# Patient Record
Sex: FEMALE | Race: OTHER | Employment: FULL TIME | URBAN - METROPOLITAN AREA
[De-identification: names, ages, dates, MRNs, and addresses within clinical notes are randomized per-mention and may not be internally consistent; named-entity substitution may affect disease eponyms.]

---

## 2017-03-22 ENCOUNTER — OFFICE VISIT (OUTPATIENT)
Dept: FAMILY MEDICINE CLINIC | Facility: CLINIC | Age: 57
End: 2017-03-22

## 2017-03-22 VITALS
SYSTOLIC BLOOD PRESSURE: 160 MMHG | DIASTOLIC BLOOD PRESSURE: 92 MMHG | TEMPERATURE: 98 F | WEIGHT: 173 LBS | HEART RATE: 61 BPM | BODY MASS INDEX: 34 KG/M2

## 2017-03-22 DIAGNOSIS — I10 ESSENTIAL HYPERTENSION WITH GOAL BLOOD PRESSURE LESS THAN 140/90: Primary | ICD-10-CM

## 2017-03-22 PROCEDURE — 99212 OFFICE O/P EST SF 10 MIN: CPT | Performed by: FAMILY MEDICINE

## 2017-03-22 PROCEDURE — 99214 OFFICE O/P EST MOD 30 MIN: CPT | Performed by: FAMILY MEDICINE

## 2017-03-22 RX ORDER — BLOOD PRESSURE TEST KIT
KIT MISCELLANEOUS
Qty: 1 KIT | Refills: 0 | Status: SHIPPED | OUTPATIENT
Start: 2017-03-22 | End: 2018-12-13

## 2017-03-22 RX ORDER — CLONIDINE HYDROCHLORIDE 0.1 MG/1
0.1 TABLET ORAL 2 TIMES DAILY
Qty: 60 TABLET | Refills: 1 | Status: SHIPPED | OUTPATIENT
Start: 2017-03-22 | End: 2017-04-05

## 2017-03-22 RX ORDER — AMLODIPINE AND VALSARTAN 5; 320 MG/1; MG/1
1 TABLET ORAL DAILY
Qty: 30 TABLET | Refills: 1 | Status: SHIPPED | OUTPATIENT
Start: 2017-03-22 | End: 2017-07-08

## 2017-03-22 NOTE — PROGRESS NOTES
3/22/2017  11:16 AM    Ousmane Duran is a 64year old female. Chief complaint(s): Patient presents with: Follow - Up: Patient here to f/u on her HTN  HTN    HPI:     Ousmane Duran primary complaint is regarding HTN.      Here for follow up regarding her HTN Cardiovascular: Negative for chest pain and palpitations. Gastrointestinal: Negative for nausea, vomiting, abdominal pain, diarrhea and constipation. Musculoskeletal: Negative for back pain. Neurological: Negative for seizures and headaches.    Psy 48.0 %   MEAN CORPUSCULAR VOLUME 96.5 80.0 - 100.0 FL   MEAN CORPUSCULAR HEMOGLOBIN 32.8 (H) 27.0 - 32.0 PG   MEAN CORPUSCULAR HEMOGLOBIN CO 33.9 32.0 - 37.0 G/DL   RED CELL DISTRIBUTION WIDTH 13.0 11.0 - 15.0 %   PLATELET COUNT (L) 429 140 - 400 K/UL   ME 30 tablet 1      Sig: Take 1 tablet by mouth daily.         RECOMMENDATIONS given include: avoid pseudoephedrine or other stimulants/decongestants in common cold remedies, decrease consumption of alcohol, perform routine monitoring of blood pressure with ho

## 2017-04-05 ENCOUNTER — OFFICE VISIT (OUTPATIENT)
Dept: FAMILY MEDICINE CLINIC | Facility: CLINIC | Age: 57
End: 2017-04-05

## 2017-04-05 VITALS
TEMPERATURE: 98 F | SYSTOLIC BLOOD PRESSURE: 132 MMHG | HEART RATE: 79 BPM | DIASTOLIC BLOOD PRESSURE: 88 MMHG | BODY MASS INDEX: 34 KG/M2 | WEIGHT: 175 LBS

## 2017-04-05 DIAGNOSIS — I10 ESSENTIAL HYPERTENSION WITH GOAL BLOOD PRESSURE LESS THAN 140/90: Primary | ICD-10-CM

## 2017-04-05 PROCEDURE — 99212 OFFICE O/P EST SF 10 MIN: CPT | Performed by: FAMILY MEDICINE

## 2017-04-05 PROCEDURE — 99214 OFFICE O/P EST MOD 30 MIN: CPT | Performed by: FAMILY MEDICINE

## 2017-04-05 RX ORDER — VALSARTAN 320 MG/1
320 TABLET ORAL DAILY
Qty: 90 TABLET | Refills: 1 | Status: SHIPPED | OUTPATIENT
Start: 2017-04-05 | End: 2017-07-08

## 2017-04-05 RX ORDER — AMLODIPINE BESYLATE 5 MG/1
5 TABLET ORAL DAILY
Qty: 90 TABLET | Refills: 2 | Status: SHIPPED | OUTPATIENT
Start: 2017-04-05 | End: 2017-07-08

## 2017-04-05 RX ORDER — CLONIDINE HYDROCHLORIDE 0.1 MG/1
0.1 TABLET ORAL 2 TIMES DAILY
Qty: 60 TABLET | Refills: 1 | Status: SHIPPED | OUTPATIENT
Start: 2017-04-05 | End: 2017-07-08

## 2017-04-05 NOTE — PROGRESS NOTES
4/5/2017  11:05 AM    Ken Kumar is a 64year old female. Chief complaint(s): Patient presents with: Follow - Up  HTN    HPI:     Ken Kumar primary complaint is regarding HTN.      Here for follow up regarding her HTN, this was first diagnosed more t of Systems   Constitutional: Negative for fever, chills and fatigue. HENT: Negative for ear pain and sore throat. Respiratory: Negative for cough and wheezing. Cardiovascular: Negative for chest pain and palpitations.    Gastrointestinal: Negative f WITH PLATELET   Result Value Ref Range   WHITE BLOOD COUNT (L) 7.1 4.0 - 11.0 K/UL   RED BLOOD CELL COUNT 4.37 3.70 - 5.40 M/UL   HEMOGLOBIN (L) 14.3 12.0 - 16.0 G/DL   HEMATOCRIT (L) 42.2 35.0 - 48.0 %   MEAN CORPUSCULAR VOLUME 96.5 80.0 - 100.0 FL   MEAN Sig: Take 1 tablet (320 mg total) by mouth daily. AmLODIPine Besylate 5 MG Oral Tab 90 tablet 2      Sig: Take 1 tablet (5 mg total) by mouth daily.       CloNIDine HCl 0.1 MG Oral Tab 60 tablet 1      Sig: Take 1 tablet (0.1 mg total) by mouth 2 (two)

## 2017-07-08 ENCOUNTER — OFFICE VISIT (OUTPATIENT)
Dept: FAMILY MEDICINE CLINIC | Facility: CLINIC | Age: 57
End: 2017-07-08

## 2017-07-08 VITALS
DIASTOLIC BLOOD PRESSURE: 85 MMHG | HEART RATE: 93 BPM | TEMPERATURE: 98 F | WEIGHT: 174 LBS | BODY MASS INDEX: 34 KG/M2 | SYSTOLIC BLOOD PRESSURE: 132 MMHG

## 2017-07-08 DIAGNOSIS — M72.2 PLANTAR FASCIITIS: ICD-10-CM

## 2017-07-08 DIAGNOSIS — I10 ESSENTIAL HYPERTENSION WITH GOAL BLOOD PRESSURE LESS THAN 140/90: Primary | ICD-10-CM

## 2017-07-08 PROCEDURE — 99212 OFFICE O/P EST SF 10 MIN: CPT | Performed by: FAMILY MEDICINE

## 2017-07-08 PROCEDURE — 99214 OFFICE O/P EST MOD 30 MIN: CPT | Performed by: FAMILY MEDICINE

## 2017-07-08 RX ORDER — VALSARTAN 320 MG/1
320 TABLET ORAL DAILY
Qty: 90 TABLET | Refills: 1 | Status: SHIPPED | OUTPATIENT
Start: 2017-07-08 | End: 2018-01-22

## 2017-07-08 RX ORDER — CLONIDINE HYDROCHLORIDE 0.1 MG/1
0.1 TABLET ORAL 2 TIMES DAILY
Qty: 60 TABLET | Refills: 1 | Status: SHIPPED | OUTPATIENT
Start: 2017-07-08 | End: 2018-12-13

## 2017-07-08 RX ORDER — AMLODIPINE BESYLATE 5 MG/1
5 TABLET ORAL DAILY
Qty: 90 TABLET | Refills: 2 | Status: SHIPPED | OUTPATIENT
Start: 2017-07-08 | End: 2018-01-22

## 2017-07-08 RX ORDER — DICLOFENAC POTASSIUM 50 MG/1
50 TABLET, FILM COATED ORAL 2 TIMES DAILY
Qty: 60 TABLET | Refills: 1 | Status: SHIPPED | OUTPATIENT
Start: 2017-07-08 | End: 2018-12-13

## 2017-07-08 NOTE — PROGRESS NOTES
7/8/2017  12:38 PM    Samina Hoff is a 64year old female. Chief complaint(s): Patient presents with: Follow - Up  HTN  Heel Pain: left heel pain    HPI:     Samina Hoff primary complaint is regarding heel pain, HTN.      Here for follow up regarding he Rfl: 2   CloNIDine HCl 0.1 MG Oral Tab Take 1 tablet (0.1 mg total) by mouth 2 (two) times daily. Disp: 60 tablet Rfl: 1   valsartan 320 MG Oral Tab Take 1 tablet (320 mg total) by mouth daily.  Disp: 90 tablet Rfl: 1   Blood Pressure Does not apply Kit [place] Negative mg/dL   BILIRUBIN negative Negative   KETONES (URINE DIPSTICK) negative Negative mg/dL   SPECIFIC GRAVITY 1.015 1.005 - 1.030   OCCULT BLOOD trace Negative   PH, URINE 6.0 4.5 - 8.0   PROTEIN (URINE DIPSTICK) negative Negative/Trace mg/dL   UROBIL AMERICAN >60 >60   GFR/ >60 >60   -TSH W REFLEX TO FREE T4   Result Value Ref Range   TSH 2.38 0.34 - 5.60 uIU/mL   -URINE CULTURE, ROUTINE   Result Value Ref Range   Urine Culture See Result      EKG / Spirometry : -     Radiology: No resu valsartan 320 MG Oral Tab 90 tablet 1      Sig: Take 1 tablet (320 mg total) by mouth daily. RECOMMENDATIONS given include: Please, call our office with any questions or concerns.  Notify Dr Robert Chery or the [address] [place] [address] if there is a deteriora

## 2017-08-28 ENCOUNTER — APPOINTMENT (OUTPATIENT)
Dept: OCCUPATIONAL MEDICINE | Age: 57
End: 2017-08-28
Attending: EMERGENCY MEDICINE

## 2018-01-22 RX ORDER — VALSARTAN 320 MG/1
320 TABLET ORAL DAILY
Qty: 90 TABLET | Refills: 0 | Status: SHIPPED | OUTPATIENT
Start: 2018-01-22 | End: 2018-12-13

## 2018-01-22 RX ORDER — AMLODIPINE BESYLATE 5 MG/1
5 TABLET ORAL DAILY
Qty: 90 TABLET | Refills: 0 | Status: SHIPPED | OUTPATIENT
Start: 2018-01-22 | End: 2018-12-13

## 2018-01-22 NOTE — TELEPHONE ENCOUNTER
Patient states that she will be leaving to [place] tomorrow and needs refills on her BP medications. Medications were pended please advise.

## 2018-12-08 RX ORDER — VALSARTAN 320 MG/1
320 TABLET ORAL DAILY
Qty: 90 TABLET | Refills: 0 | OUTPATIENT
Start: 2018-12-08

## 2018-12-08 RX ORDER — DICLOFENAC POTASSIUM 50 MG/1
50 TABLET, FILM COATED ORAL 2 TIMES DAILY
Qty: 60 TABLET | Refills: 1 | OUTPATIENT
Start: 2018-12-08

## 2018-12-08 RX ORDER — AMLODIPINE BESYLATE 5 MG/1
5 TABLET ORAL DAILY
Qty: 90 TABLET | Refills: 0 | OUTPATIENT
Start: 2018-12-08

## 2018-12-10 RX ORDER — CLONIDINE HYDROCHLORIDE 0.1 MG/1
TABLET ORAL
Qty: 60 TABLET | Refills: 1 | OUTPATIENT
Start: 2018-12-10

## 2018-12-10 RX ORDER — AMLODIPINE BESYLATE 5 MG/1
TABLET ORAL
Qty: 90 TABLET | Refills: 0 | OUTPATIENT
Start: 2018-12-10

## 2018-12-13 ENCOUNTER — OFFICE VISIT (OUTPATIENT)
Dept: FAMILY MEDICINE CLINIC | Facility: CLINIC | Age: 58
End: 2018-12-13
Payer: COMMERCIAL

## 2018-12-13 VITALS
WEIGHT: 163.19 LBS | BODY MASS INDEX: 32 KG/M2 | SYSTOLIC BLOOD PRESSURE: 160 MMHG | DIASTOLIC BLOOD PRESSURE: 93 MMHG | HEART RATE: 65 BPM | TEMPERATURE: 98 F

## 2018-12-13 DIAGNOSIS — I10 ESSENTIAL HYPERTENSION: Primary | ICD-10-CM

## 2018-12-13 PROCEDURE — 99212 OFFICE O/P EST SF 10 MIN: CPT | Performed by: FAMILY MEDICINE

## 2018-12-13 PROCEDURE — 99214 OFFICE O/P EST MOD 30 MIN: CPT | Performed by: FAMILY MEDICINE

## 2018-12-13 RX ORDER — AMLODIPINE BESYLATE 5 MG/1
5 TABLET ORAL DAILY
Qty: 90 TABLET | Refills: 1 | Status: SHIPPED | OUTPATIENT
Start: 2018-12-13 | End: 2019-03-18

## 2018-12-13 RX ORDER — CLONIDINE HYDROCHLORIDE 0.1 MG/1
0.1 TABLET ORAL 2 TIMES DAILY
Qty: 180 TABLET | Refills: 1 | Status: SHIPPED | OUTPATIENT
Start: 2018-12-13 | End: 2019-08-17

## 2018-12-13 RX ORDER — VALSARTAN 320 MG/1
320 TABLET ORAL DAILY
Qty: 90 TABLET | Refills: 1 | Status: SHIPPED | OUTPATIENT
Start: 2018-12-13 | End: 2019-08-17

## 2018-12-13 NOTE — PROGRESS NOTES
12/13/2018  4:27 PM    Eugene Leone is a 62year old female. Chief complaint(s): Patient presents with: Follow - Up  HTN    HPI:     Eugene Leone primary complaint is regarding HTN.      Patient is a 62 yrs old female, here for follow up regarding her HTN for ear pain and sore throat. Respiratory: Negative for cough and wheezing. Cardiovascular: Negative for chest pain and palpitations. Gastrointestinal: Negative for nausea, vomiting, abdominal pain, diarrhea and constipation.    Musculoskeletal: Neg remedies, decrease consumption of alcohol, perform routine monitoring of blood pressure with home blood pressure cuff, exercise, reduction of dietary salt intake, take medication as prescribed, try not to miss doses, smoking cessation, weight loss, and str

## 2018-12-17 ENCOUNTER — TELEPHONE (OUTPATIENT)
Dept: FAMILY MEDICINE CLINIC | Facility: CLINIC | Age: 58
End: 2018-12-17

## 2018-12-17 RX ORDER — OLMESARTAN MEDOXOMIL AND HYDROCHLOROTHIAZIDE 40/12.5 40; 12.5 MG/1; MG/1
1 TABLET ORAL DAILY
Qty: 30 TABLET | Refills: 3 | Status: SHIPPED | OUTPATIENT
Start: 2018-12-17 | End: 2019-08-17

## 2018-12-17 NOTE — TELEPHONE ENCOUNTER
Pt is requesting new Blood Pressure meds. Pt stts the meds Dr prescribe is no longer making them. Pt was told from pharmacy.

## 2018-12-17 NOTE — TELEPHONE ENCOUNTER
Patient is on 3 BP medications, need to confirm which she is referring to. Contacted pharmacy, was on hold several times, unable to confirm with them. Left message with son requesting she call our office back to confirm which med they are no longer making.

## 2018-12-17 NOTE — TELEPHONE ENCOUNTER
Ricardocassi Timbo returned call, she does not recall which medication she was told.  She reports pharmacy faxed over the information to Dr BABB's office, however on file there are only noted to be refills and all refills have been sent, no new script was sent in as an al

## 2018-12-17 NOTE — TELEPHONE ENCOUNTER
Pt states she spoke with her pharmacy and it is the Valsartan that is on recall. Pt will need an alternative sent to pharmacy.

## 2019-03-19 RX ORDER — AMLODIPINE BESYLATE 5 MG/1
TABLET ORAL
Qty: 90 TABLET | Refills: 1 | Status: SHIPPED | OUTPATIENT
Start: 2019-03-19 | End: 2019-08-17

## 2019-08-12 ENCOUNTER — HOSPITAL ENCOUNTER (OUTPATIENT)
Age: 59
Discharge: HOME | End: 2019-08-12
Attending: EMERGENCY MEDICINE
Payer: COMMERCIAL

## 2019-08-12 VITALS
HEART RATE: 67 BPM | WEIGHT: 160 LBS | DIASTOLIC BLOOD PRESSURE: 95 MMHG | SYSTOLIC BLOOD PRESSURE: 157 MMHG | HEIGHT: 62 IN | TEMPERATURE: 98 F | OXYGEN SATURATION: 100 % | BODY MASS INDEX: 29.44 KG/M2 | RESPIRATION RATE: 18 BRPM

## 2019-08-12 DIAGNOSIS — H81.11 BENIGN PAROXYSMAL POSITIONAL VERTIGO OF RIGHT EAR: Primary | ICD-10-CM

## 2019-08-12 DIAGNOSIS — H61.21 IMPACTED CERUMEN OF RIGHT EAR: ICD-10-CM

## 2019-08-12 PROCEDURE — 69209 REMOVE IMPACTED EAR WAX UNI: CPT

## 2019-08-12 PROCEDURE — 99213 OFFICE O/P EST LOW 20 MIN: CPT

## 2019-08-12 PROCEDURE — 99204 OFFICE O/P NEW MOD 45 MIN: CPT

## 2019-08-12 RX ORDER — NEOMYCIN SULFATE, POLYMYXIN B SULFATE AND HYDROCORTISONE 10; 3.5; 1 MG/ML; MG/ML; [USP'U]/ML
1 SUSPENSION/ DROPS AURICULAR (OTIC) 4 TIMES DAILY
Qty: 1 BOTTLE | Refills: 0 | Status: SHIPPED | OUTPATIENT
Start: 2019-08-12 | End: 2019-09-28

## 2019-08-12 RX ORDER — ONDANSETRON 4 MG/1
4 TABLET, ORALLY DISINTEGRATING ORAL ONCE
Status: COMPLETED | OUTPATIENT
Start: 2019-08-12 | End: 2019-08-12

## 2019-08-12 RX ORDER — MECLIZINE HCL 12.5 MG/1
25 TABLET ORAL ONCE
Status: COMPLETED | OUTPATIENT
Start: 2019-08-12 | End: 2019-08-12

## 2019-08-12 RX ORDER — ONDANSETRON 4 MG/1
4 TABLET, ORALLY DISINTEGRATING ORAL EVERY 4 HOURS PRN
Qty: 10 TABLET | Refills: 0 | Status: SHIPPED | OUTPATIENT
Start: 2019-08-12 | End: 2019-08-19

## 2019-08-12 RX ORDER — MECLIZINE HYDROCHLORIDE 25 MG/1
25 TABLET ORAL 3 TIMES DAILY PRN
Qty: 21 TABLET | Refills: 0 | Status: SHIPPED | OUTPATIENT
Start: 2019-08-12 | End: 2019-08-17

## 2019-08-12 NOTE — ED PROVIDER NOTES
Patient Seen in: [facility] In [address] [place] [address]    History   Patient presents with:  Nausea/Vomiting/Diarrhea (gastrointestinal)    Stated Complaint: dizzy and vomiting     HPI    Patient complains of dizziness that started today at 1230.  P HPI.  Constitutional and vital signs reviewed. All other systems reviewed and negative except as noted above. PSFH elements reviewed from today and agreed except as otherwise stated in HPI.     Physical Exam     ED Triage Vitals [08/12/19 1732]   BP (4 mg total) by mouth every 4 (four) hours as needed for Nausea. Qty: 10 tablet Refills: 0    Meclizine HCl 25 MG Oral Tab  Take 1 tablet (25 mg total) by mouth 3 (three) times daily as needed.   Qty: 21 tablet Refills: 0    Neomycin-Polymyxin-HC [id number]

## 2019-08-12 NOTE — ED INITIAL ASSESSMENT (HPI)
Pt reports feeling dizzy like the room is spinning since this morning. Two episodes of vomiting. Denies abdominal pain, chest pain, shortness of breath, cough, congestion, ear pain or fever.

## 2019-08-17 ENCOUNTER — OFFICE VISIT (OUTPATIENT)
Dept: FAMILY MEDICINE CLINIC | Facility: CLINIC | Age: 59
End: 2019-08-17

## 2019-08-17 VITALS
BODY MASS INDEX: 32 KG/M2 | HEIGHT: 60 IN | DIASTOLIC BLOOD PRESSURE: 88 MMHG | SYSTOLIC BLOOD PRESSURE: 152 MMHG | WEIGHT: 163 LBS

## 2019-08-17 DIAGNOSIS — H81.10 BENIGN PAROXYSMAL POSITIONAL VERTIGO, UNSPECIFIED LATERALITY: ICD-10-CM

## 2019-08-17 DIAGNOSIS — I10 ESSENTIAL HYPERTENSION: Primary | ICD-10-CM

## 2019-08-17 PROCEDURE — 99214 OFFICE O/P EST MOD 30 MIN: CPT | Performed by: FAMILY MEDICINE

## 2019-08-17 RX ORDER — MECLIZINE HCL 12.5 MG/1
12.5 TABLET ORAL 3 TIMES DAILY PRN
Qty: 40 TABLET | Refills: 1 | Status: SHIPPED | OUTPATIENT
Start: 2019-08-17

## 2019-08-17 RX ORDER — CLONIDINE HYDROCHLORIDE 0.1 MG/1
0.1 TABLET ORAL 2 TIMES DAILY
Qty: 180 TABLET | Refills: 1 | Status: SHIPPED | OUTPATIENT
Start: 2019-08-17 | End: 2020-03-21

## 2019-08-17 RX ORDER — AMLODIPINE BESYLATE 5 MG/1
5 TABLET ORAL
Qty: 90 TABLET | Refills: 1 | Status: SHIPPED | OUTPATIENT
Start: 2019-08-17 | End: 2020-03-21

## 2019-08-17 RX ORDER — OLMESARTAN MEDOXOMIL AND HYDROCHLOROTHIAZIDE 40/12.5 40; 12.5 MG/1; MG/1
1 TABLET ORAL DAILY
Qty: 30 TABLET | Refills: 3 | Status: SHIPPED | OUTPATIENT
Start: 2019-08-17 | End: 2020-03-21

## 2019-08-17 NOTE — PROGRESS NOTES
8/17/2019  10:54 AM    Quinton Gould is a 62year old female. Chief complaint(s): Patient presents with:  Dizziness: x 5 days  HTN  HPI:     Quinton Gould primary complaint is regarding as above.      Patient is a 62 yrs old female, here for follow up regard tobacco: Never Used    Alcohol use: No      Alcohol/week: 0.0 standard drinks    Drug use: No       Immunizations: There is no immunization history on file for this patient.     Medications (Active prior to today's visit):    Current Outpatient Delbert Ashraf Mouth/Throat: Oropharynx is clear and moist.   Eyes: Conjunctivae are normal.   Neck: Neck supple. Cardiovascular: Normal rate and regular rhythm. Carotid bruit not present. Pulmonary/Chest: Effort normal.   Neurological: No cranial nerve deficit. •           • Meclizine HCl 12.5 MG Oral Tab 40 tablet 1     Sig: Take 1 tablet (12.5 mg total) by mouth 3 (three) times daily as needed. • Multiple Vitamins-Minerals (MULTIVITAL) Oral Tab 100 tablet 2     Sig: Take 1 tablet by mouth daily.

## 2019-09-28 ENCOUNTER — OFFICE VISIT (OUTPATIENT)
Dept: FAMILY MEDICINE CLINIC | Facility: CLINIC | Age: 59
End: 2019-09-28

## 2019-09-28 ENCOUNTER — LAB ENCOUNTER (OUTPATIENT)
Dept: LAB | Age: 59
End: 2019-09-28
Attending: FAMILY MEDICINE
Payer: COMMERCIAL

## 2019-09-28 VITALS
HEIGHT: 60 IN | WEIGHT: 164 LBS | SYSTOLIC BLOOD PRESSURE: 154 MMHG | HEART RATE: 57 BPM | TEMPERATURE: 98 F | BODY MASS INDEX: 32.2 KG/M2 | DIASTOLIC BLOOD PRESSURE: 82 MMHG

## 2019-09-28 DIAGNOSIS — I10 ESSENTIAL HYPERTENSION: Primary | ICD-10-CM

## 2019-09-28 DIAGNOSIS — I10 ESSENTIAL HYPERTENSION: ICD-10-CM

## 2019-09-28 LAB
ALBUMIN SERPL-MCNC: 3.8 G/DL (ref 3.4–5)
ALBUMIN/GLOB SERPL: 1 {RATIO} (ref 1–2)
ALP LIVER SERPL-CCNC: 88 U/L (ref 46–118)
ALT SERPL-CCNC: 32 U/L (ref 13–56)
ANION GAP SERPL CALC-SCNC: 5 MMOL/L (ref 0–18)
AST SERPL-CCNC: 18 U/L (ref 15–37)
BASOPHILS # BLD AUTO: 0.06 X10(3) UL (ref 0–0.2)
BASOPHILS NFR BLD AUTO: 0.9 %
BILIRUB SERPL-MCNC: 0.5 MG/DL (ref 0.1–2)
BUN BLD-MCNC: 10 MG/DL (ref 7–18)
BUN/CREAT SERPL: 15.6 (ref 10–20)
CALCIUM BLD-MCNC: 9.2 MG/DL (ref 8.5–10.1)
CHLORIDE SERPL-SCNC: 110 MMOL/L (ref 98–112)
CHOLEST SMN-MCNC: 200 MG/DL (ref ?–200)
CO2 SERPL-SCNC: 30 MMOL/L (ref 21–32)
CREAT BLD-MCNC: 0.64 MG/DL (ref 0.55–1.02)
DEPRECATED RDW RBC AUTO: 44.6 FL (ref 35.1–46.3)
EOSINOPHIL # BLD AUTO: 0.21 X10(3) UL (ref 0–0.7)
EOSINOPHIL NFR BLD AUTO: 3.2 %
ERYTHROCYTE [DISTWIDTH] IN BLOOD BY AUTOMATED COUNT: 12.6 % (ref 11–15)
GLOBULIN PLAS-MCNC: 3.7 G/DL (ref 2.8–4.4)
GLUCOSE BLD-MCNC: 86 MG/DL (ref 70–99)
HCT VFR BLD AUTO: 37 % (ref 35–48)
HDLC SERPL-MCNC: 56 MG/DL (ref 40–59)
HGB BLD-MCNC: 12.4 G/DL (ref 12–16)
IMM GRANULOCYTES # BLD AUTO: 0.01 X10(3) UL (ref 0–1)
IMM GRANULOCYTES NFR BLD: 0.2 %
LDLC SERPL CALC-MCNC: 123 MG/DL (ref ?–100)
LYMPHOCYTES # BLD AUTO: 2.58 X10(3) UL (ref 1–4)
LYMPHOCYTES NFR BLD AUTO: 38.9 %
M PROTEIN MFR SERPL ELPH: 7.5 G/DL (ref 6.4–8.2)
MCH RBC QN AUTO: 32.6 PG (ref 26–34)
MCHC RBC AUTO-ENTMCNC: 33.5 G/DL (ref 31–37)
MCV RBC AUTO: 97.4 FL (ref 80–100)
MONOCYTES # BLD AUTO: 0.45 X10(3) UL (ref 0.1–1)
MONOCYTES NFR BLD AUTO: 6.8 %
NEUTROPHILS # BLD AUTO: 3.33 X10 (3) UL (ref 1.5–7.7)
NEUTROPHILS # BLD AUTO: 3.33 X10(3) UL (ref 1.5–7.7)
NEUTROPHILS NFR BLD AUTO: 50 %
NONHDLC SERPL-MCNC: 144 MG/DL (ref ?–130)
OSMOLALITY SERPL CALC.SUM OF ELEC: 298 MOSM/KG (ref 275–295)
PATIENT FASTING: YES
PATIENT FASTING: YES
PLATELET # BLD AUTO: 264 10(3)UL (ref 150–450)
POTASSIUM SERPL-SCNC: 3.8 MMOL/L (ref 3.5–5.1)
RBC # BLD AUTO: 3.8 X10(6)UL (ref 3.8–5.3)
SODIUM SERPL-SCNC: 145 MMOL/L (ref 136–145)
TRIGL SERPL-MCNC: 103 MG/DL (ref 30–149)
VLDLC SERPL CALC-MCNC: 21 MG/DL (ref 0–30)
WBC # BLD AUTO: 6.6 X10(3) UL (ref 4–11)

## 2019-09-28 PROCEDURE — 80053 COMPREHEN METABOLIC PANEL: CPT

## 2019-09-28 PROCEDURE — 99213 OFFICE O/P EST LOW 20 MIN: CPT | Performed by: FAMILY MEDICINE

## 2019-09-28 PROCEDURE — 80061 LIPID PANEL: CPT

## 2019-09-28 PROCEDURE — 36415 COLL VENOUS BLD VENIPUNCTURE: CPT

## 2019-09-28 PROCEDURE — 85025 COMPLETE CBC W/AUTO DIFF WBC: CPT

## 2019-09-28 RX ORDER — BLOOD PRESSURE TEST KIT
KIT MISCELLANEOUS
Qty: 1 KIT | Refills: 0 | Status: SHIPPED | OUTPATIENT
Start: 2019-09-28 | End: 2019-10-26

## 2019-09-28 NOTE — PROGRESS NOTES
9/28/2019  12:15 PM    Pily Meter is a 62year old female. Chief complaint(s): Patient presents with:  Hypertension: 1 month f/u    HPI:     Pily Meter primary complaint is regarding HTN.      Patient is a 62 yrs old female, here for follow up regardin total) by mouth 3 (three) times daily as needed. Disp: 40 tablet Rfl: 1   Multiple Vitamins-Minerals (MULTIVITAL) Oral Tab Take 1 tablet by mouth daily.  Disp: 100 tablet Rfl: 2       Allergies:    Lisinopril                  Comment:Other reaction(s): tong Visit:  Requested Prescriptions     Signed Prescriptions Disp Refills   • Blood Pressure Does not apply Kit 1 kit 0     Sig: Use as directed       Imaging & Referrals:  None         James Suarez MD

## 2019-10-26 ENCOUNTER — OFFICE VISIT (OUTPATIENT)
Dept: FAMILY MEDICINE CLINIC | Facility: CLINIC | Age: 59
End: 2019-10-26

## 2019-10-26 VITALS
HEIGHT: 60 IN | BODY MASS INDEX: 32.47 KG/M2 | WEIGHT: 165.38 LBS | HEART RATE: 66 BPM | TEMPERATURE: 98 F | SYSTOLIC BLOOD PRESSURE: 138 MMHG | DIASTOLIC BLOOD PRESSURE: 76 MMHG

## 2019-10-26 DIAGNOSIS — I10 ESSENTIAL HYPERTENSION: ICD-10-CM

## 2019-10-26 DIAGNOSIS — L84 FOOT CALLUS: Primary | ICD-10-CM

## 2019-10-26 PROCEDURE — 99213 OFFICE O/P EST LOW 20 MIN: CPT | Performed by: FAMILY MEDICINE

## 2019-10-26 NOTE — PROGRESS NOTES
10/26/2019  11:07 AM    Megha Angel is a 62year old female. Chief complaint(s): Patient presents with:  HTN: f/u  Foot Pain: referral to podiatrist     HPI:     Megha Angel primary complaint is regarding as above.      Patient is a 62 yrs old female, he Take 1 tablet (5 mg total) by mouth once daily. , Disp: 90 tablet, Rfl: 1  cloNIDine HCl 0.1 MG Oral Tab, Take 1 tablet (0.1 mg total) by mouth 2 (two) times daily. , Disp: 180 tablet, Rfl: 1  Olmesartan Medoxomil-HCTZ 40-12.5 MG Oral Tab, Take 1 tablet by m 98 - 112 mmol/L    CO2 30.0 21.0 - 32.0 mmol/L    Anion Gap 5 0 - 18 mmol/L    BUN 10 7 - 18 mg/dL    Creatinine 0.64 0.55 - 1.02 mg/dL    BUN/CREA Ratio 15.6 10.0 - 20.0    Calcium, Total 9.2 8.5 - 10.1 mg/dL    Calculated Osmolality 298 (H) 275 - 295 mOs Signed Prescriptions Disp Refills   • Diclofenac Sodium 1 % Transdermal Gel 60 g 2     Sig: Apply 4 g topically 4 (four) times daily. Apply to affected area(s). LABORATORY & ORDERS: No orders of the defined types were placed in this encounter.

## 2020-03-20 NOTE — TELEPHONE ENCOUNTER
Please advise on refill request for HTN medications.     Hypertensive Medications  Protocol Criteria:  · Appointment scheduled in the past 6 months or in the next 3 months  · BMP or CMP in the past 12 months  · Creatinine result < 2  Recent Outpatient Visit

## 2020-03-20 NOTE — TELEPHONE ENCOUNTER
Pt is requesting refill for her blood pressure medication. Pt does not know the name of medication.  Please advise

## 2020-03-21 RX ORDER — AMLODIPINE BESYLATE 5 MG/1
5 TABLET ORAL
Qty: 90 TABLET | Refills: 1 | Status: SHIPPED | OUTPATIENT
Start: 2020-03-21

## 2020-03-21 RX ORDER — CLONIDINE HYDROCHLORIDE 0.1 MG/1
0.1 TABLET ORAL 2 TIMES DAILY
Qty: 180 TABLET | Refills: 1 | Status: SHIPPED | OUTPATIENT
Start: 2020-03-21

## 2020-03-21 RX ORDER — OLMESARTAN MEDOXOMIL AND HYDROCHLOROTHIAZIDE 40/12.5 40; 12.5 MG/1; MG/1
1 TABLET ORAL DAILY
Qty: 30 TABLET | Refills: 3 | Status: SHIPPED | OUTPATIENT
Start: 2020-03-21 | End: 2020-04-11

## 2020-04-10 ENCOUNTER — NURSE TRIAGE (OUTPATIENT)
Dept: FAMILY MEDICINE CLINIC | Facility: CLINIC | Age: 60
End: 2020-04-10

## 2020-04-10 NOTE — TELEPHONE ENCOUNTER
Please reply to pool: EM RN TRIAGE  Action Requested: Summary for Provider     []  Critical Lab, Recommendations Needed  [x] Need Additional Advice  []   FYI    [x]   Need Orders  [x] Need Medications Sent to Pharmacy  []  Other     SUMMARY: declines tel

## 2020-04-10 NOTE — TELEPHONE ENCOUNTER
Call transferred by CSS: Porfirio Hanna (YAZMIN on file) calling and was informed of Sarina Bains NP's response/recommendations below but son states the [employer] had stated there was another provider in the [facility] office tomorrow.  Asking if can be scheduled with that pro

## 2020-04-10 NOTE — TELEPHONE ENCOUNTER
Will not be able to fully evaluate without telephone visit. May try otc antacid such as tums, tagamet or pepcid. Pt to call if s/s are not improving or are worsening.

## 2020-04-11 ENCOUNTER — OFFICE VISIT (OUTPATIENT)
Dept: FAMILY MEDICINE CLINIC | Facility: CLINIC | Age: 60
End: 2020-04-11

## 2020-04-11 VITALS
BODY MASS INDEX: 31.61 KG/M2 | HEART RATE: 89 BPM | DIASTOLIC BLOOD PRESSURE: 99 MMHG | TEMPERATURE: 98 F | SYSTOLIC BLOOD PRESSURE: 141 MMHG | HEIGHT: 60 IN | WEIGHT: 161 LBS

## 2020-04-11 DIAGNOSIS — R10.33 PERIUMBILICAL ABDOMINAL PAIN: Primary | ICD-10-CM

## 2020-04-11 DIAGNOSIS — R11.0 NAUSEA: ICD-10-CM

## 2020-04-11 DIAGNOSIS — R19.7 DIARRHEA, UNSPECIFIED TYPE: ICD-10-CM

## 2020-04-11 DIAGNOSIS — I10 ESSENTIAL HYPERTENSION: ICD-10-CM

## 2020-04-11 PROCEDURE — 99214 OFFICE O/P EST MOD 30 MIN: CPT | Performed by: FAMILY MEDICINE

## 2020-04-11 RX ORDER — OLMESARTAN MEDOXOMIL AND HYDROCHLOROTHIAZIDE 40/12.5 40; 12.5 MG/1; MG/1
1 TABLET ORAL DAILY
Qty: 90 TABLET | Refills: 0 | Status: SHIPPED | OUTPATIENT
Start: 2020-04-11 | End: 2021-04-06

## 2020-04-11 NOTE — PROGRESS NOTES
Patient ID: Quinton Gould is a 61year old female.     HPI  Patient presents with:  Abdominal Pain: x 2 days    Telephone message from 4/10/20:  SUMMARY: declines telephone/virtual visit, only wants medication for her stomach pain, allergy and pharmacy verif appetite. Denies dysuria and blood in the urine.        Lab Results   Component Value Date    WBC 6.6 09/28/2019    RBC 3.80 09/28/2019    HGB 12.4 09/28/2019    HCT 37.0 09/28/2019    .0 09/28/2019    MPV 10.4 (H) 01/08/2016    MCV 97.4 09/28/2019 VLDL 21 09/28/2019    Babar 144 (H) 09/28/2019       Wt Readings from Last 6 Encounters:  04/11/20 : 161 lb (73 kg)  10/26/19 : 165 lb 6.4 oz (75 kg)  09/28/19 : 164 lb (74.4 kg)  08/17/19 : 163 lb (73.9 kg)  08/12/19 : 160 lb (72.6 kg)  12/13/18 : 163 l daily as needed. 40 tablet 1   • Multiple Vitamins-Minerals (MULTIVITAL) Oral Tab Take 1 tablet by mouth daily. 100 tablet 2   • Olmesartan Medoxomil-HCTZ 40-12.5 MG Oral Tab Take 1 tablet by mouth daily.  (Patient not taking: Reported on 4/11/2020 ) 30 tab type  Incredibly better dehydration. Essential hypertension  -     Olmesartan Medoxomil-HCTZ 40-12.5 MG Oral Tab; Take 1 tablet by mouth daily. On behalf of Dr. Delfina Ziegler who is out.   Refilled her medication but told her she must take all 3 medications lui

## 2020-05-18 ENCOUNTER — TELEPHONE (OUTPATIENT)
Dept: FAMILY MEDICINE CLINIC | Facility: CLINIC | Age: 60
End: 2020-05-18

## 2020-05-18 NOTE — TELEPHONE ENCOUNTER
Action Requested: Summary for Provider     []  Critical Lab, Recommendations Needed  [] Need Additional Advice  []   FYI    []   Need Orders  [] Need Medications Sent to Pharmacy  []  Other     SUMMARY: pt will monitor for symptoms    With assist of Tyra Sousa

## 2021-04-06 ENCOUNTER — IMMUNIZATION (OUTPATIENT)
Dept: LAB | Facility: HOSPITAL | Age: 61
End: 2021-04-06
Attending: HOSPITALIST
Payer: COMMERCIAL

## 2021-04-06 DIAGNOSIS — Z23 NEED FOR VACCINATION: Primary | ICD-10-CM

## 2021-04-06 PROCEDURE — 0011A SARSCOV2 VAC 100MCG/0.5ML IM: CPT

## 2021-05-04 ENCOUNTER — IMMUNIZATION (OUTPATIENT)
Dept: LAB | Facility: HOSPITAL | Age: 61
End: 2021-05-04
Attending: EMERGENCY MEDICINE
Payer: COMMERCIAL

## 2021-05-04 DIAGNOSIS — Z23 NEED FOR VACCINATION: Primary | ICD-10-CM

## 2021-05-04 PROCEDURE — 0012A SARSCOV2 VAC 100MCG/0.5ML IM: CPT

## 2021-06-05 ENCOUNTER — TELEPHONE (OUTPATIENT)
Dept: FAMILY MEDICINE CLINIC | Facility: CLINIC | Age: 61
End: 2021-06-05

## 2021-08-16 ENCOUNTER — TELEPHONE (OUTPATIENT)
Dept: CASE MANAGEMENT | Age: 61
End: 2021-08-16

## 2021-12-20 ENCOUNTER — IMMUNIZATION (OUTPATIENT)
Dept: LAB | Facility: HOSPITAL | Age: 61
End: 2021-12-20
Attending: EMERGENCY MEDICINE
Payer: COMMERCIAL

## 2021-12-20 DIAGNOSIS — Z23 NEED FOR VACCINATION: Primary | ICD-10-CM

## 2021-12-20 PROCEDURE — 0064A SARSCOV2 VAC 50MCG/0.25ML IM: CPT

## 2022-03-03 ENCOUNTER — TELEPHONE (OUTPATIENT)
Dept: CASE MANAGEMENT | Age: 62
End: 2022-03-03

## 2022-03-03 NOTE — TELEPHONE ENCOUNTER
Patient is due for annual physical exam w/PCP. LOV 2020. [facility]  [phone] Yisel Franck left msg to call back.

## 2022-03-29 ENCOUNTER — TELEPHONE (OUTPATIENT)
Dept: FAMILY MEDICINE CLINIC | Facility: CLINIC | Age: 62
End: 2022-03-29

## 2022-03-29 NOTE — TELEPHONE ENCOUNTER
Per daughter in law, patients cell is [phone]  NO VM set up  If call back , please schedule patient for a PX

## 2022-06-07 ENCOUNTER — TELEPHONE (OUTPATIENT)
Dept: INTERNAL MEDICINE CLINIC | Facility: CLINIC | Age: 62
End: 2022-06-07

## 2022-07-02 ENCOUNTER — OFFICE VISIT (OUTPATIENT)
Dept: FAMILY MEDICINE CLINIC | Facility: CLINIC | Age: 62
End: 2022-07-02
Payer: COMMERCIAL

## 2022-07-02 VITALS
HEART RATE: 88 BPM | DIASTOLIC BLOOD PRESSURE: 120 MMHG | SYSTOLIC BLOOD PRESSURE: 199 MMHG | WEIGHT: 170 LBS | HEIGHT: 60 IN | BODY MASS INDEX: 33.38 KG/M2

## 2022-07-02 DIAGNOSIS — I10 PRIMARY HYPERTENSION: ICD-10-CM

## 2022-07-02 DIAGNOSIS — L30.9 DERMATITIS: Primary | ICD-10-CM

## 2022-07-02 RX ORDER — CLONIDINE HYDROCHLORIDE 0.2 MG/1
0.2 TABLET ORAL 2 TIMES DAILY
Qty: 60 TABLET | Refills: 2 | Status: SHIPPED | OUTPATIENT
Start: 2022-07-02

## 2022-07-02 RX ORDER — VALSARTAN 320 MG/1
320 TABLET ORAL DAILY
Qty: 90 TABLET | Refills: 2 | Status: SHIPPED | OUTPATIENT
Start: 2022-07-02

## 2022-07-02 RX ORDER — MOMETASONE FUROATE 1 MG/G
CREAM TOPICAL
Qty: 30 G | Refills: 1 | Status: SHIPPED | OUTPATIENT
Start: 2022-07-02

## 2022-07-02 RX ORDER — AMLODIPINE BESYLATE 5 MG/1
5 TABLET ORAL
Qty: 90 TABLET | Refills: 1 | Status: SHIPPED | OUTPATIENT
Start: 2022-07-02

## 2022-07-25 ENCOUNTER — OFFICE VISIT (OUTPATIENT)
Dept: FAMILY MEDICINE CLINIC | Facility: CLINIC | Age: 62
End: 2022-07-25
Payer: COMMERCIAL

## 2022-07-25 VITALS
DIASTOLIC BLOOD PRESSURE: 88 MMHG | SYSTOLIC BLOOD PRESSURE: 139 MMHG | HEIGHT: 60 IN | WEIGHT: 168.19 LBS | HEART RATE: 75 BPM | BODY MASS INDEX: 33.02 KG/M2

## 2022-07-25 DIAGNOSIS — M17.12 PRIMARY OSTEOARTHRITIS OF LEFT KNEE: ICD-10-CM

## 2022-07-25 DIAGNOSIS — I10 PRIMARY HYPERTENSION: Primary | ICD-10-CM

## 2022-07-25 RX ORDER — IBUPROFEN 600 MG/1
600 TABLET ORAL EVERY 6 HOURS PRN
Qty: 60 TABLET | Refills: 3 | Status: SHIPPED | OUTPATIENT
Start: 2022-07-25

## 2022-07-25 RX ORDER — METHYLPREDNISOLONE ACETATE 80 MG/ML
80 INJECTION, SUSPENSION INTRA-ARTICULAR; INTRALESIONAL; INTRAMUSCULAR; SOFT TISSUE ONCE
Status: COMPLETED | OUTPATIENT
Start: 2022-07-25 | End: 2022-07-25

## 2022-07-25 RX ADMIN — METHYLPREDNISOLONE ACETATE 80 MG: 80 INJECTION, SUSPENSION INTRA-ARTICULAR; INTRALESIONAL; INTRAMUSCULAR; SOFT TISSUE at 16:00:00

## 2022-08-13 ENCOUNTER — OFFICE VISIT (OUTPATIENT)
Dept: FAMILY MEDICINE CLINIC | Facility: CLINIC | Age: 62
End: 2022-08-13
Payer: COMMERCIAL

## 2022-08-13 VITALS
HEART RATE: 86 BPM | SYSTOLIC BLOOD PRESSURE: 129 MMHG | DIASTOLIC BLOOD PRESSURE: 79 MMHG | HEIGHT: 60 IN | BODY MASS INDEX: 32.79 KG/M2 | WEIGHT: 167 LBS

## 2022-08-13 DIAGNOSIS — Z12.31 ENCOUNTER FOR SCREENING MAMMOGRAM FOR MALIGNANT NEOPLASM OF BREAST: ICD-10-CM

## 2022-08-13 DIAGNOSIS — Z12.11 COLON CANCER SCREENING: ICD-10-CM

## 2022-08-13 DIAGNOSIS — I10 PRIMARY HYPERTENSION: Primary | ICD-10-CM

## 2022-08-13 PROCEDURE — 99213 OFFICE O/P EST LOW 20 MIN: CPT | Performed by: FAMILY MEDICINE

## 2022-08-13 PROCEDURE — 3008F BODY MASS INDEX DOCD: CPT | Performed by: FAMILY MEDICINE

## 2022-08-13 PROCEDURE — 3078F DIAST BP <80 MM HG: CPT | Performed by: FAMILY MEDICINE

## 2022-08-13 PROCEDURE — 3074F SYST BP LT 130 MM HG: CPT | Performed by: FAMILY MEDICINE

## 2022-08-13 RX ORDER — CLONIDINE HYDROCHLORIDE 0.2 MG/1
0.2 TABLET ORAL 2 TIMES DAILY
Qty: 180 TABLET | Refills: 2 | Status: SHIPPED | OUTPATIENT
Start: 2022-08-13

## 2022-08-13 RX ORDER — VALSARTAN 320 MG/1
320 TABLET ORAL DAILY
Qty: 90 TABLET | Refills: 2 | Status: SHIPPED | OUTPATIENT
Start: 2022-08-13

## 2022-08-13 RX ORDER — AMLODIPINE BESYLATE 5 MG/1
5 TABLET ORAL
Qty: 90 TABLET | Refills: 1 | Status: SHIPPED | OUTPATIENT
Start: 2022-08-13

## 2022-09-11 ENCOUNTER — HOSPITAL ENCOUNTER (OUTPATIENT)
Age: 62
Discharge: HOME | End: 2022-09-11
Payer: COMMERCIAL

## 2022-09-11 DIAGNOSIS — Z20.822 ENCOUNTER FOR LABORATORY TESTING FOR COVID-19 VIRUS: Primary | ICD-10-CM

## 2022-09-11 LAB — SARS-COV-2 RNA RESP QL NAA+PROBE: DETECTED

## 2023-02-09 ENCOUNTER — TELEPHONE (OUTPATIENT)
Dept: FAMILY MEDICINE CLINIC | Facility: CLINIC | Age: 63
End: 2023-02-09

## 2023-02-09 DIAGNOSIS — Z12.11 COLON CANCER SCREENING: ICD-10-CM

## 2023-02-09 DIAGNOSIS — Z12.31 ENCOUNTER FOR SCREENING MAMMOGRAM FOR MALIGNANT NEOPLASM OF BREAST: Primary | ICD-10-CM

## 2023-02-13 ENCOUNTER — NURSE TRIAGE (OUTPATIENT)
Dept: FAMILY MEDICINE CLINIC | Facility: CLINIC | Age: 63
End: 2023-02-13

## 2023-02-14 ENCOUNTER — HOSPITAL ENCOUNTER (OUTPATIENT)
Dept: GENERAL RADIOLOGY | Age: 63
Discharge: HOME | End: 2023-02-14
Attending: NURSE PRACTITIONER
Payer: COMMERCIAL

## 2023-02-14 ENCOUNTER — OFFICE VISIT (OUTPATIENT)
Dept: FAMILY MEDICINE CLINIC | Facility: CLINIC | Age: 63
End: 2023-02-14

## 2023-02-14 VITALS
HEIGHT: 60 IN | SYSTOLIC BLOOD PRESSURE: 148 MMHG | HEART RATE: 79 BPM | BODY MASS INDEX: 30.82 KG/M2 | DIASTOLIC BLOOD PRESSURE: 83 MMHG | WEIGHT: 157 LBS

## 2023-02-14 DIAGNOSIS — M25.561 ACUTE PAIN OF RIGHT KNEE: ICD-10-CM

## 2023-02-14 DIAGNOSIS — M25.561 ACUTE PAIN OF RIGHT KNEE: Primary | ICD-10-CM

## 2023-02-14 PROCEDURE — 3077F SYST BP >= 140 MM HG: CPT | Performed by: NURSE PRACTITIONER

## 2023-02-14 PROCEDURE — 3008F BODY MASS INDEX DOCD: CPT | Performed by: NURSE PRACTITIONER

## 2023-02-14 PROCEDURE — 73564 X-RAY EXAM KNEE 4 OR MORE: CPT | Performed by: NURSE PRACTITIONER

## 2023-02-14 PROCEDURE — 3079F DIAST BP 80-89 MM HG: CPT | Performed by: NURSE PRACTITIONER

## 2023-02-14 PROCEDURE — 99214 OFFICE O/P EST MOD 30 MIN: CPT | Performed by: NURSE PRACTITIONER

## 2023-02-14 RX ORDER — NAPROXEN SODIUM 220 MG
TABLET ORAL
COMMUNITY

## 2023-02-14 RX ORDER — ACETAMINOPHEN 500 MG
500 TABLET ORAL EVERY 6 HOURS PRN
COMMUNITY

## 2023-02-25 ENCOUNTER — OFFICE VISIT (OUTPATIENT)
Dept: FAMILY MEDICINE CLINIC | Facility: CLINIC | Age: 63
End: 2023-02-25

## 2023-02-25 VITALS
BODY MASS INDEX: 31.09 KG/M2 | SYSTOLIC BLOOD PRESSURE: 139 MMHG | HEART RATE: 66 BPM | HEIGHT: 60 IN | WEIGHT: 158.38 LBS | DIASTOLIC BLOOD PRESSURE: 82 MMHG

## 2023-02-25 DIAGNOSIS — M17.12 PRIMARY OSTEOARTHRITIS OF LEFT KNEE: ICD-10-CM

## 2023-02-25 DIAGNOSIS — I10 PRIMARY HYPERTENSION: Primary | ICD-10-CM

## 2023-02-25 PROCEDURE — 3079F DIAST BP 80-89 MM HG: CPT | Performed by: FAMILY MEDICINE

## 2023-02-25 PROCEDURE — 3075F SYST BP GE 130 - 139MM HG: CPT | Performed by: FAMILY MEDICINE

## 2023-02-25 PROCEDURE — 3008F BODY MASS INDEX DOCD: CPT | Performed by: FAMILY MEDICINE

## 2023-02-25 PROCEDURE — 99213 OFFICE O/P EST LOW 20 MIN: CPT | Performed by: FAMILY MEDICINE

## 2023-02-25 RX ORDER — CHLORTHALIDONE 25 MG/1
25 TABLET ORAL DAILY
Qty: 30 TABLET | Refills: 3 | Status: SHIPPED | OUTPATIENT
Start: 2023-02-25

## 2023-02-25 RX ORDER — TEA TREE OIL 100 %
OIL (ML) TOPICAL
Qty: 180 CAPSULE | Refills: 3 | Status: SHIPPED | OUTPATIENT
Start: 2023-02-25

## 2023-02-25 RX ORDER — AMLODIPINE BESYLATE 10 MG/1
10 TABLET ORAL
Qty: 90 TABLET | Refills: 1 | Status: SHIPPED | OUTPATIENT
Start: 2023-02-25

## 2023-04-08 ENCOUNTER — LAB ENCOUNTER (OUTPATIENT)
Dept: LAB | Age: 63
End: 2023-04-08
Attending: FAMILY MEDICINE
Payer: COMMERCIAL

## 2023-04-08 ENCOUNTER — OFFICE VISIT (OUTPATIENT)
Dept: FAMILY MEDICINE CLINIC | Facility: CLINIC | Age: 63
End: 2023-04-08

## 2023-04-08 VITALS
SYSTOLIC BLOOD PRESSURE: 139 MMHG | HEIGHT: 60 IN | WEIGHT: 155 LBS | BODY MASS INDEX: 30.43 KG/M2 | DIASTOLIC BLOOD PRESSURE: 82 MMHG | HEART RATE: 93 BPM

## 2023-04-08 DIAGNOSIS — I10 PRIMARY HYPERTENSION: ICD-10-CM

## 2023-04-08 DIAGNOSIS — I10 PRIMARY HYPERTENSION: Primary | ICD-10-CM

## 2023-04-08 LAB
ALBUMIN SERPL-MCNC: 4 G/DL (ref 3.4–5)
ALBUMIN/GLOB SERPL: 1 {RATIO} (ref 1–2)
ALP LIVER SERPL-CCNC: 107 U/L
ALT SERPL-CCNC: 25 U/L
ANION GAP SERPL CALC-SCNC: 3 MMOL/L (ref 0–18)
AST SERPL-CCNC: 17 U/L (ref 15–37)
BASOPHILS # BLD AUTO: 0.06 X10(3) UL (ref 0–0.2)
BASOPHILS NFR BLD AUTO: 0.8 %
BILIRUB SERPL-MCNC: 0.5 MG/DL (ref 0.1–2)
BILIRUB UR QL: NEGATIVE
BUN BLD-MCNC: 13 MG/DL (ref 7–18)
BUN/CREAT SERPL: 21.3 (ref 10–20)
CALCIUM BLD-MCNC: 9.2 MG/DL (ref 8.5–10.1)
CHLORIDE SERPL-SCNC: 106 MMOL/L (ref 98–112)
CHOLEST SERPL-MCNC: 222 MG/DL (ref ?–200)
CO2 SERPL-SCNC: 32 MMOL/L (ref 21–32)
COLOR UR: YELLOW
CREAT BLD-MCNC: 0.61 MG/DL
DEPRECATED RDW RBC AUTO: 42.5 FL (ref 35.1–46.3)
EOSINOPHIL # BLD AUTO: 0.15 X10(3) UL (ref 0–0.7)
EOSINOPHIL NFR BLD AUTO: 2.1 %
ERYTHROCYTE [DISTWIDTH] IN BLOOD BY AUTOMATED COUNT: 12.3 % (ref 11–15)
FASTING PATIENT LIPID ANSWER: YES
FASTING STATUS PATIENT QL REPORTED: YES
GFR SERPLBLD BASED ON 1.73 SQ M-ARVRAT: 101 ML/MIN/1.73M2 (ref 60–?)
GLOBULIN PLAS-MCNC: 3.9 G/DL (ref 2.8–4.4)
GLUCOSE BLD-MCNC: 89 MG/DL (ref 70–99)
GLUCOSE UR-MCNC: NORMAL MG/DL
HCT VFR BLD AUTO: 39.9 %
HDLC SERPL-MCNC: 59 MG/DL (ref 40–59)
HGB BLD-MCNC: 13.4 G/DL
HGB UR QL STRIP.AUTO: NEGATIVE
HYALINE CASTS #/AREA URNS AUTO: PRESENT /LPF
IMM GRANULOCYTES # BLD AUTO: 0.01 X10(3) UL (ref 0–1)
IMM GRANULOCYTES NFR BLD: 0.1 %
KETONES UR-MCNC: NEGATIVE MG/DL
LDLC SERPL CALC-MCNC: 139 MG/DL (ref ?–100)
LEUKOCYTE ESTERASE UR QL STRIP.AUTO: 500
LYMPHOCYTES # BLD AUTO: 3 X10(3) UL (ref 1–4)
LYMPHOCYTES NFR BLD AUTO: 41.2 %
MCH RBC QN AUTO: 31.9 PG (ref 26–34)
MCHC RBC AUTO-ENTMCNC: 33.6 G/DL (ref 31–37)
MCV RBC AUTO: 95 FL
MONOCYTES # BLD AUTO: 0.5 X10(3) UL (ref 0.1–1)
MONOCYTES NFR BLD AUTO: 6.9 %
NEUTROPHILS # BLD AUTO: 3.56 X10 (3) UL (ref 1.5–7.7)
NEUTROPHILS # BLD AUTO: 3.56 X10(3) UL (ref 1.5–7.7)
NEUTROPHILS NFR BLD AUTO: 48.9 %
NITRITE UR QL STRIP.AUTO: NEGATIVE
NONHDLC SERPL-MCNC: 163 MG/DL (ref ?–130)
OSMOLALITY SERPL CALC.SUM OF ELEC: 292 MOSM/KG (ref 275–295)
PH UR: 6.5 [PH] (ref 5–8)
PLATELET # BLD AUTO: 287 10(3)UL (ref 150–450)
POTASSIUM SERPL-SCNC: 3.2 MMOL/L (ref 3.5–5.1)
PROT SERPL-MCNC: 7.9 G/DL (ref 6.4–8.2)
PROT UR-MCNC: 20 MG/DL
RBC # BLD AUTO: 4.2 X10(6)UL
SODIUM SERPL-SCNC: 141 MMOL/L (ref 136–145)
SP GR UR STRIP: 1.02 (ref 1–1.03)
TRIGL SERPL-MCNC: 134 MG/DL (ref 30–149)
UROBILINOGEN UR STRIP-ACNC: NORMAL
VLDLC SERPL CALC-MCNC: 25 MG/DL (ref 0–30)
WBC # BLD AUTO: 7.3 X10(3) UL (ref 4–11)
WBC #/AREA URNS AUTO: >50 /HPF
YEAST UR QL: PRESENT /HPF

## 2023-04-08 PROCEDURE — 3075F SYST BP GE 130 - 139MM HG: CPT | Performed by: FAMILY MEDICINE

## 2023-04-08 PROCEDURE — 99213 OFFICE O/P EST LOW 20 MIN: CPT | Performed by: FAMILY MEDICINE

## 2023-04-08 PROCEDURE — 85025 COMPLETE CBC W/AUTO DIFF WBC: CPT

## 2023-04-08 PROCEDURE — 3008F BODY MASS INDEX DOCD: CPT | Performed by: FAMILY MEDICINE

## 2023-04-08 PROCEDURE — 80061 LIPID PANEL: CPT

## 2023-04-08 PROCEDURE — 87086 URINE CULTURE/COLONY COUNT: CPT

## 2023-04-08 PROCEDURE — 3079F DIAST BP 80-89 MM HG: CPT | Performed by: FAMILY MEDICINE

## 2023-04-08 PROCEDURE — 80053 COMPREHEN METABOLIC PANEL: CPT

## 2023-04-08 PROCEDURE — 81001 URINALYSIS AUTO W/SCOPE: CPT

## 2023-04-08 PROCEDURE — 36415 COLL VENOUS BLD VENIPUNCTURE: CPT

## 2023-04-08 RX ORDER — BLOOD PRESSURE TEST KIT
KIT MISCELLANEOUS
Qty: 1 KIT | Refills: 0 | Status: SHIPPED | OUTPATIENT
Start: 2023-04-08

## 2023-06-20 ENCOUNTER — HOSPITAL ENCOUNTER (OUTPATIENT)
Age: 63
Discharge: HOME | End: 2023-06-20
Payer: COMMERCIAL

## 2023-06-20 VITALS
HEART RATE: 81 BPM | RESPIRATION RATE: 18 BRPM | DIASTOLIC BLOOD PRESSURE: 84 MMHG | OXYGEN SATURATION: 100 % | SYSTOLIC BLOOD PRESSURE: 155 MMHG | TEMPERATURE: 98 F
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
## 2023-06-20 PROCEDURE — 99213 OFFICE O/P EST LOW 20 MIN: CPT | Performed by: NURSE PRACTITIONER

## 2023-06-20 NOTE — ED INITIAL ASSESSMENT (HPI)
Pt's  noted redness to patient's L lateral ankle. Patient states is not causing pain. States only change is recent bilateral leg swelling. Non pitting. No shortness of breath.

## 2023-09-27 RX ORDER — AMLODIPINE BESYLATE 5 MG/1
5 TABLET ORAL DAILY
Qty: 90 TABLET | Refills: 0 | Status: SHIPPED | OUTPATIENT
Start: 2023-09-27

## 2023-09-27 RX ORDER — CLONIDINE HYDROCHLORIDE 0.2 MG/1
0.2 TABLET ORAL 2 TIMES DAILY
Qty: 180 TABLET | Refills: 0 | Status: SHIPPED | OUTPATIENT
Start: 2023-09-27

## 2023-09-27 RX ORDER — VALSARTAN 320 MG/1
320 TABLET ORAL DAILY
Qty: 90 TABLET | Refills: 0 | Status: SHIPPED | OUTPATIENT
Start: 2023-09-27

## 2023-09-27 NOTE — TELEPHONE ENCOUNTER
Please review; protocol failed.     Requested Prescriptions   Pending Prescriptions Disp Refills    VALSARTAN 320 MG Oral Tab [Pharmacy Med Name: Valsartan 320 MG Oral Tablet] 90 tablet 0     Sig: Take 1 tablet by mouth once daily       Hypertensive Medications Protocol Failed - 9/26/2023 12:59 PM        Failed - Last BP reading less than 140/90     BP Readings from Last 1 Encounters:  06/20/23 : 155/84              Passed - In person appointment in the past 12 or next 3 months     Recent Outpatient Visits              5 months ago Primary hypertension    [address], [place] [address], Delonte Dozier MD    Office Visit    7 months ago Primary hypertension    [facility], [address], Delonte Dozier MD    Office Visit    7 months ago Acute pain of right knee    [address], [place], [address] [place] [address], [facility]    Office Visit    1 year ago Primary hypertension    [facility], [place], Delonte Dozier MD    Office Visit    1 year ago Primary hypertension    [address] [facility], Delonte Dozier MD    Office Visit                      Passed - CMP or BMP in past 6 months     Recent Results (from the past 4392 hour(s))   Comp Metabolic Panel (14)    Collection Time: 04/08/23 12:27 PM   Result Value Ref Range    Glucose 89 70 - 99 mg/dL    Sodium 141 136 - 145 mmol/L    Potassium 3.2 (L) 3.5 - 5.1 mmol/L    Chloride 106 98 - 112 mmol/L    CO2 32.0 21.0 - 32.0 mmol/L    Anion Gap 3 0 - 18 mmol/L    BUN 13 7 - 18 mg/dL    Creatinine 0.61 0.55 - 1.02 mg/dL    BUN/CREA Ratio 21.3 (H) 10.0 - 20.0    Calcium, Total 9.2 8.5 - 10.1 mg/dL    Calculated Osmolality 292 275 - 295 mOsm/kg    eGFR-Cr 101 >=60 mL/min/1.73m2    ALT 25 13 - 56 U/L    AST 17 15 - 37 U/L    Alkaline Phosphatase 107 50 - 130 U/L    Bilirubin, Total 0.5 0.1 - 2.0 mg/dL    Total Protein 7.9 6.4 - 8.2 g/dL    Albumin 4.0 3.4 - 5.0 g/dL    Globulin  3.9 2.8 - 4.4 g/dL    A/G Ratio 1.0 1.0 - 2.0    Patient Fasting for CMP? Yes      *Note: Due to a large number of results and/or encounters for the requested time period, some results have not been displayed. A complete set of results can be found in Results Review.                Passed - In person appointment or virtual visit in the past 6 months     Recent Outpatient Visits              5 months ago Primary hypertension    [facility], Sampson Pati[facility]    Office Visit    7 months ago Primary hypertension    [facility], Sampson Patiño MD    Office Visit    7 months ago Acute pain of right knee    [address] [facility], [address] [place] [address]    Office Visit    1 year ago Primary hypertension    [address] [facility], Norma Adamson MD    Office Visit    1 year ago Primary hypertension    [address] [facility], Norma Adamson MD    Office Visit                      Passed - EGFRCR or GFRNAA > 50     GFR Evaluation  EGFRCR: 101 , resulted on 4/8/2023            CLONIDINE 0.2 MG Oral Tab [Pharmacy Med Name: cloNIDine HCl 0.2 MG Oral Tablet] 180 tablet 0     Sig: Take 1 tablet by mouth twice daily       Hypertensive Medications Protocol Failed - 9/26/2023 12:59 PM        Failed - Last BP reading less than 140/90     BP Readings from Last 1 Encounters:  06/20/23 : 155/84              Passed - In person appointment in the past 12 or next 3 months     Recent Outpatient Visits              5 months ago Primary hypertension    [address], [place], Norma Adamson MD    Office Visit    7 months ago Primary hypertension    [address] [facility], Norma Adamson MD    Office Visit    7 months ago Acute pain of right knee    [facility], [facility]    Office Visit    1 year ago Primary hypertension    [facility], [facility], Sampson Beck MD    Office Visit    1 year ago Primary hypertension    [facility], [facility], Lona Phoenix, MD    Office Visit                      Passed - CMP or BMP in past 6 months     Recent Results (from the past 4392 hour(s))   Comp Metabolic Panel (14)    Collection Time: 04/08/23 12:27 PM   Result Value Ref Range    Glucose 89 70 - 99 mg/dL    Sodium 141 136 - 145 mmol/L    Potassium 3.2 (L) 3.5 - 5.1 mmol/L    Chloride 106 98 - 112 mmol/L    CO2 32.0 21.0 - 32.0 mmol/L    Anion Gap 3 0 - 18 mmol/L    BUN 13 7 - 18 mg/dL    Creatinine 0.61 0.55 - 1.02 mg/dL    BUN/CREA Ratio 21.3 (H) 10.0 - 20.0    Calcium, Total 9.2 8.5 - 10.1 mg/dL    Calculated Osmolality 292 275 - 295 mOsm/kg    eGFR-Cr 101 >=60 mL/min/1.73m2    ALT 25 13 - 56 U/L    AST 17 15 - 37 U/L    Alkaline Phosphatase 107 50 - 130 U/L    Bilirubin, Total 0.5 0.1 - 2.0 mg/dL    Total Protein 7.9 6.4 - 8.2 g/dL    Albumin 4.0 3.4 - 5.0 g/dL    Globulin  3.9 2.8 - 4.4 g/dL    A/G Ratio 1.0 1.0 - 2.0    Patient Fasting for CMP? Yes      *Note: Due to a large number of results and/or encounters for the requested time period, some results have not been displayed. A complete set of results can be found in Results Review.                Passed - In person appointment or virtual visit in the past 6 months     Recent Outpatient Visits              5 months ago Primary hypertension    [address], Sampson Beck [facility]    Office Visit    7 months ago Primary hypertension    [facility], [facility], Lona Phoenix, MD    Office Visit    7 months ago Acute pain of right knee    Derotha Au, [address], APRN    Office Visit    1 year ago Primary hypertension Kym Ji, Sampson Chu MD    Office Visit    1 year ago Primary hypertension    [facility], [place], Tiffanie Arango MD    Office Visit                      Passed - EGFRCR or GFRNAA > 50     GFR Evaluation  EGFRCR: 101 , resulted on 4/8/2023            AMLODIPINE 5 MG Oral Tab [Pharmacy Med Name: amLODIPine Besylate 5 MG Oral Tablet] 90 tablet 0     Sig: Take 1 tablet by mouth once daily       Hypertensive Medications Protocol Failed - 9/26/2023 12:59 PM        Failed - Last BP reading less than 140/90     BP Readings from Last 1 Encounters:  06/20/23 : 155/84              Passed - In person appointment in the past 12 or next 3 months     Recent Outpatient Visits              5 months ago Primary hypertension    [facility], [place], Tiffanie Arango MD    Office Visit    7 months ago Primary hypertension    [facility], [place], Tiffanie Arango MD    Office Visit    7 months ago Acute pain of right knee    Kym Ji, [address] [place] [address]    Office Visit    1 year ago Primary hypertension    [facility], [place], Sampson Chu MD    Office Visit    1 year ago Primary hypertension    Sampson Hernandez MD    Office Visit                      Passed - CMP or BMP in past 6 months     Recent Results (from the past 4392 hour(s))   Comp Metabolic Panel (14)    Collection Time: 04/08/23 12:27 PM   Result Value Ref Range    Glucose 89 70 - 99 mg/dL    Sodium 141 136 - 145 mmol/L    Potassium 3.2 (L) 3.5 - 5.1 mmol/L    Chloride 106 98 - 112 mmol/L    CO2 32.0 21.0 - 32.0 mmol/L    Anion Gap 3 0 - 18 mmol/L    BUN 13 7 - 18 mg/dL    Creatinine 0.61 0.55 - 1.02 mg/dL    BUN/CREA Ratio 21.3 (H) 10.0 - 20.0    Calcium, Total 9.2 8.5 - 10.1 mg/dL    Calculated Osmolality 292 275 - 295 mOsm/kg    eGFR-Cr 101 >=60 mL/min/1.73m2    ALT 25 13 - 56 U/L    AST 17 15 - 37 U/L    Alkaline Phosphatase 107 50 - 130 U/L    Bilirubin, Total 0.5 0.1 - 2.0 mg/dL    Total Protein 7.9 6.4 - 8.2 g/dL    Albumin 4.0 3.4 - 5.0 g/dL    Globulin  3.9 2.8 - 4.4 g/dL    A/G Ratio 1.0 1.0 - 2.0    Patient Fasting for CMP? Yes      *Note: Due to a large number of results and/or encounters for the requested time period, some results have not been displayed. A complete set of results can be found in Results Review.                Passed - In person appointment or virtual visit in the past 6 months     Recent Outpatient Visits              5 months ago Primary hypertension    [facility], [address] [address], Rinku Bain MD    Office Visit    7 months ago Primary hypertension    [facility], [address], Rinku Bain MD    Office Visit    7 months ago Acute pain of right knee    [facility], [address], APRN    Office Visit    1 year ago Primary hypertension    [address] [facility], Rinku Bain MD    Office Visit    1 year ago Primary hypertension    [address] [facility], Rinku Bain [facility]    Office Visit                      Passed - [facility] or GFRNAA > 50     GFR Evaluation  EGFRCR: 101 , resulted on 4/8/2023                   Recent Outpatient Visits              5 months ago Primary hypertension    [facility], [address], Rinuk Bani MD    Office Visit    7 months ago Primary hypertension    [address], Rinku Bain MD    Office Visit    7 months ago Acute pain of right knee    [address] [facility], [address], APRN    Office Visit    1 year ago Primary hypertension    [address] [facility]    Office Visit    1 year ago Primary hypertension    Diane Kapoor MD    Office Visit

## 2023-10-10 ENCOUNTER — TELEPHONE (OUTPATIENT)
Dept: FAMILY MEDICINE CLINIC | Facility: CLINIC | Age: 63
End: 2023-10-10

## 2023-10-21 ENCOUNTER — OFFICE VISIT (OUTPATIENT)
Dept: FAMILY MEDICINE CLINIC | Facility: CLINIC | Age: 63
End: 2023-10-21

## 2023-10-21 VITALS
HEIGHT: 60 IN | BODY MASS INDEX: 31.38 KG/M2 | SYSTOLIC BLOOD PRESSURE: 139 MMHG | WEIGHT: 159.81 LBS | DIASTOLIC BLOOD PRESSURE: 88 MMHG | HEART RATE: 69 BPM

## 2023-10-21 DIAGNOSIS — Z12.31 ENCOUNTER FOR SCREENING MAMMOGRAM FOR MALIGNANT NEOPLASM OF BREAST: ICD-10-CM

## 2023-10-21 DIAGNOSIS — I10 PRIMARY HYPERTENSION: Primary | ICD-10-CM

## 2023-10-21 DIAGNOSIS — E78.00 PURE HYPERCHOLESTEROLEMIA: ICD-10-CM

## 2023-10-21 PROCEDURE — 3075F SYST BP GE 130 - 139MM HG: CPT | Performed by: FAMILY MEDICINE

## 2023-10-21 PROCEDURE — 99214 OFFICE O/P EST MOD 30 MIN: CPT | Performed by: FAMILY MEDICINE

## 2023-10-21 PROCEDURE — 3008F BODY MASS INDEX DOCD: CPT | Performed by: FAMILY MEDICINE

## 2023-10-21 PROCEDURE — 3079F DIAST BP 80-89 MM HG: CPT | Performed by: FAMILY MEDICINE

## 2023-10-21 RX ORDER — AMLODIPINE BESYLATE 10 MG/1
10 TABLET ORAL
Qty: 90 TABLET | Refills: 1 | Status: SHIPPED | OUTPATIENT
Start: 2023-10-21

## 2023-10-21 RX ORDER — CHLORTHALIDONE 25 MG/1
25 TABLET ORAL DAILY
Qty: 90 TABLET | Refills: 1 | Status: SHIPPED | OUTPATIENT
Start: 2023-10-21

## 2023-10-21 RX ORDER — ATORVASTATIN CALCIUM 20 MG/1
20 TABLET, FILM COATED ORAL NIGHTLY
Qty: 90 TABLET | Refills: 3 | Status: SHIPPED | OUTPATIENT
Start: 2023-10-21

## 2023-10-21 RX ORDER — VALSARTAN 320 MG/1
320 TABLET ORAL DAILY
Qty: 90 TABLET | Refills: 1 | Status: SHIPPED | OUTPATIENT
Start: 2023-10-21

## 2023-10-21 RX ORDER — CLONIDINE HYDROCHLORIDE 0.2 MG/1
0.2 TABLET ORAL 2 TIMES DAILY
Qty: 180 TABLET | Refills: 1 | Status: SHIPPED | OUTPATIENT
Start: 2023-10-21

## 2023-12-21 RX ORDER — AMLODIPINE BESYLATE 5 MG/1
5 TABLET ORAL DAILY
Qty: 90 TABLET | Refills: 0 | OUTPATIENT
Start: 2023-12-21

## 2023-12-22 NOTE — TELEPHONE ENCOUNTER
Please review. Protocol failed / No Protocol.    Requested Prescriptions   Pending Prescriptions Disp Refills    AMLODIPINE 5 MG Oral Tab [Pharmacy Med Name: amLODIPine Besylate 5 MG Oral Tablet] 90 tablet 0     Sig: Take 1 tablet by mouth once daily       Hypertensive Medications Protocol Failed - 12/21/2023  6:30 AM        Failed - CMP or BMP in past 6 months     No results found for this or any previous visit (from the past 4392 hour(s)).            Passed - In person appointment in the past 12 or next 3 months     Recent Outpatient Visits              2 [facility] ago [facility]    [facility] Visit    8 months ago [facility]    [facility]    Office Visit    9 months ago [facility] hypertension    [facility]    Office Visit    10 months ago [facility] right [facility]    [facility]    [facility]    1 year ago [facility]    Office Visit                      Passed - Last BP reading less than 140/90     BP Readings from Last 1 Encounters:   10/21/23 139/88               Passed - In person appointment or virtual visit in the past 6 months     Recent Outpatient Visits              2 months ago [facility] hypertension    [facility]    Office Visit    8 months ago Primary hypertension    [facility]    Office Visit    9 months ago Primary hypertension    [facility]    Office Visit    10 months ago [facility] right [facility]    Office Visit    1 year ago Primary hypertension    [facility], [address], Dillon James MD    Office Visit                      Passed - EGFRCR or GFRNAA > 50     GFR Evaluation  EGFRCR: 101 , resulted on 4/8/2023

## 2024-06-17 ENCOUNTER — TELEPHONE (OUTPATIENT)
Dept: FAMILY MEDICINE CLINIC | Facility: CLINIC | Age: 64
End: 2024-06-17

## 2024-06-17 NOTE — TELEPHONE ENCOUNTER
Patient is due for an annual physical. Left message with family member, patient to return call. If patient returns call please assist scheduling appointment.

## 2024-06-25 RX ORDER — VALSARTAN 320 MG/1
320 TABLET ORAL DAILY
Qty: 90 TABLET | Refills: 0 | Status: SHIPPED | OUTPATIENT
Start: 2024-06-25

## 2024-06-25 RX ORDER — CLONIDINE HYDROCHLORIDE 0.2 MG/1
0.2 TABLET ORAL 2 TIMES DAILY
Qty: 180 TABLET | Refills: 0 | Status: SHIPPED | OUTPATIENT
Start: 2024-06-25

## 2024-06-25 NOTE — TELEPHONE ENCOUNTER
Please review; protocol failed/ has no protocol      No active /future labs noted     Requested Prescriptions   Pending Prescriptions Disp Refills    CLONIDINE 0.2 MG Oral Tab [Pharmacy Med Name: cloNIDine HCl 0.2 MG Oral Tablet] 180 tablet 0     Sig: Take 1 tablet by mouth twice daily       Hypertension Medications Protocol Failed - 6/22/2024  6:31 AM        Failed - CMP or BMP in past 12 months        Failed - EGFRCR or GFRNAA > 50     GFR Evaluation            Passed - Last BP reading less than 140/90     BP Readings from Last 1 Encounters:   10/21/23 139/88               Passed - In person appointment or virtual visit in the past 12 mos or appointment in next 3 mos     Recent Outpatient Visits              8 months ago Primary hypertension    [facility], Dillon James MD    Office Visit    1 year ago [facility] hypertension    [facility]    [facility]    1 year ago [facility]    [facility]    [facility]    1 year ago Acute pain of right knee    [facility]    [facility] Visit    1 year ago [facility] hypertension    [facility]    [facility]                        VALSARTAN 320 MG Oral Tab [Pharmacy Med Name: Valsartan 320 MG Oral Tablet] 90 tablet 0     Sig: Take 1 tablet by mouth once daily       Hypertension Medications Protocol Failed - 6/22/2024  6:31 AM        Failed - CMP or BMP in past 12 months        Failed - EGFRCR or GFRNAA > 50     GFR Evaluation            Passed - Last BP reading less than 140/90     BP Readings from Last 1 Encounters:   10/21/23 139/88               Passed - In person appointment or virtual visit in the past 12 mos or appointment in next 3 mos     Recent Outpatient Visits              8  months ago Primary hypertension    [facility] [address], Dillon James MD    Office Visit    1 year ago Primary hypertension    [facility] [address], Dillon James MD    Office Visit    1 year ago Primary hypertension    [facility] [address], Dillon James MD    Office Visit    1 year ago Acute pain of right knee    [facility], Beth Tafoya APRN    Office Visit    1 year ago Primary hypertension    [facility] [address], Dillon James MD    Office Visit                         Recent Outpatient Visits              8 months ago Primary hypertension    [facility] [address], Dillon James MD    Office Visit    1 year ago Primary hypertension    [facility] [address], Dillon James MD    Office Visit    1 year ago Primary hypertension    [facility] [address], Dillon James MD    Office Visit    1 year ago Acute pain of right knee    [facility] [address], Beth Tafoya APRN    Office Visit    1 year ago Primary hypertension    [facility] [address], Dillon James MD    Office Visit

## 2024-09-27 ENCOUNTER — TELEPHONE (OUTPATIENT)
Dept: FAMILY MEDICINE CLINIC | Facility: CLINIC | Age: 64
End: 2024-09-27

## 2024-10-01 RX ORDER — CLONIDINE HYDROCHLORIDE 0.2 MG/1
0.2 TABLET ORAL 2 TIMES DAILY
Qty: 60 TABLET | Refills: 0 | Status: SHIPPED | OUTPATIENT
Start: 2024-10-01

## 2024-10-01 RX ORDER — VALSARTAN 320 MG/1
320 TABLET ORAL DAILY
Qty: 60 TABLET | Refills: 0 | Status: SHIPPED | OUTPATIENT
Start: 2024-10-01

## 2024-10-01 NOTE — TELEPHONE ENCOUNTER
Please Review. Protocol Failed; No Protocol     Requested Prescriptions   Pending Prescriptions Disp Refills    CLONIDINE 0.2 MG Oral Tab [Pharmacy Med Name: cloNIDine HCl 0.2 MG Oral Tablet] 180 tablet 0     Sig: Take 1 tablet by mouth twice daily       Hypertension Medications Protocol Failed - 10/1/2024  3:28 PM        Failed - CMP or BMP in past 12 months        Failed - EGFRCR or GFRNAA > 50     GFR Evaluation            Passed - Last BP reading less than 140/90     BP Readings from Last 1 Encounters:   10/21/23 139/88               Passed - In person appointment or virtual visit in the past 12 mos or appointment in next 3 mos     Recent Outpatient Visits              11 months ago Primary hypertension    [facility] [address], Dillon James MD    Office Visit    1 year ago Primary hypertension    [facility]    Office Visit    1 year ago Primary hypertension    [facility]    Office Visit    1 year ago Acute pain of right knee    [facility], Beth Tafoya APRN    Office Visit    2 years ago Primary hypertension    [facility] [address], Dillon James MD    Office Visit                        VALSARTAN 320 MG Oral Tab [Pharmacy Med Name: Valsartan 320 MG Oral Tablet] 90 tablet 0     Sig: Take 1 tablet by mouth once daily       Hypertension Medications Protocol Failed - 9/27/2024  6:31 AM        Failed - CMP or BMP in past 12 months        Failed - EGFRCR or GFRNAA > 50     GFR Evaluation            Passed - Last BP reading less than 140/90     BP Readings from Last 1 Encounters:   10/21/23 139/88               Passed - In person appointment or virtual visit in the past 12 mos or appointment in next 3 mos     Recent Outpatient Visits              11 months ago Primary hypertension     [facility] [address], Dillon James MD    Office Visit    1 year ago Primary hypertension    [facility] [address], Dillon James MD    Office Visit    1 year ago Primary hypertension    [facility] [address], Dillon James MD    Office Visit    1 year ago Acute pain of right knee    [facility], Beth Tafoya APRN    Office Visit    2 years ago Primary hypertension    [facility] [address], Dillon James MD    Office Visit                               Recent Outpatient Visits              11 months ago Primary hypertension    [facility] [address], Dillon James MD    Office Visit    1 year ago Primary hypertension    [facility] [address], Dillon James MD    Office Visit    1 year ago Primary hypertension    [facility] [address], Dillon James MD    Office Visit    1 year ago Acute pain of right knee    [facility] [address], Beth Tafoya APRN    Office Visit    2 years ago Primary hypertension    [facility] [address], Dillon James MD    Office Visit

## 2025-01-27 ENCOUNTER — OFFICE VISIT (OUTPATIENT)
Dept: FAMILY MEDICINE CLINIC | Facility: CLINIC | Age: 65
End: 2025-01-27

## 2025-01-27 VITALS
BODY MASS INDEX: 31.57 KG/M2 | WEIGHT: 160.81 LBS | HEART RATE: 76 BPM | SYSTOLIC BLOOD PRESSURE: 160 MMHG | HEIGHT: 60 IN | DIASTOLIC BLOOD PRESSURE: 88 MMHG

## 2025-01-27 DIAGNOSIS — Z12.31 ENCOUNTER FOR SCREENING MAMMOGRAM FOR MALIGNANT NEOPLASM OF BREAST: ICD-10-CM

## 2025-01-27 DIAGNOSIS — I10 PRIMARY HYPERTENSION: Primary | ICD-10-CM

## 2025-01-27 PROCEDURE — 3008F BODY MASS INDEX DOCD: CPT | Performed by: FAMILY MEDICINE

## 2025-01-27 PROCEDURE — 3079F DIAST BP 80-89 MM HG: CPT | Performed by: FAMILY MEDICINE

## 2025-01-27 PROCEDURE — 3077F SYST BP >= 140 MM HG: CPT | Performed by: FAMILY MEDICINE

## 2025-01-27 PROCEDURE — 99213 OFFICE O/P EST LOW 20 MIN: CPT | Performed by: FAMILY MEDICINE

## 2025-01-27 RX ORDER — VALSARTAN 320 MG/1
320 TABLET ORAL DAILY
Qty: 90 TABLET | Refills: 1 | Status: SHIPPED | OUTPATIENT
Start: 2025-01-27

## 2025-01-27 RX ORDER — CLONIDINE HYDROCHLORIDE 0.2 MG/1
0.2 TABLET ORAL 2 TIMES DAILY
Qty: 60 TABLET | Refills: 1 | Status: SHIPPED | OUTPATIENT
Start: 2025-01-27

## 2025-01-27 RX ORDER — AMLODIPINE BESYLATE 10 MG/1
10 TABLET ORAL
Qty: 90 TABLET | Refills: 1 | Status: SHIPPED | OUTPATIENT
Start: 2025-01-27

## 2025-01-27 RX ORDER — CHLORTHALIDONE 25 MG/1
25 TABLET ORAL DAILY
Qty: 90 TABLET | Refills: 1 | Status: SHIPPED | OUTPATIENT
Start: 2025-01-27

## 2025-01-27 RX ORDER — BLOOD PRESSURE TEST KIT
KIT MISCELLANEOUS
Qty: 1 KIT | Refills: 0 | Status: SHIPPED | OUTPATIENT
Start: 2025-01-27

## 2025-01-27 RX ORDER — ATORVASTATIN CALCIUM 20 MG/1
20 TABLET, FILM COATED ORAL NIGHTLY
Qty: 90 TABLET | Refills: 3 | Status: SHIPPED | OUTPATIENT
Start: 2025-01-27

## 2025-01-27 NOTE — PROGRESS NOTES
1/27/2025  5:12 PM    Maureen Beltran is a 64 year old female.    Chief complaint(s):   Chief Complaint   Patient presents with    HTN     HPI:     Maureen Beltran primary complaint is regarding HTN.     Patient is a 64 yrs old female, here for follow up regarding her HTN, this was first diagnosed more than  17 years ago.  Current nonpharmacologic treatment includes low sodium diet and exercise.  Her current cardiac medication regimen includes  Clonidine 0.2mg BID, Amlodipine 10 mg, Chlorthalidone 25 mg, Valsartan 320 mg .  Maureen Beltran dose not check her blood pressure other than at his clinic appointments.  Maureen Beltran is tolerating the medication well without side effects.  Compliance with treatment has been POOR.      HISTORY:  Past Medical History:    Essential hypertension    Lipid screening    Per [facility]      Past Surgical History:   Procedure Laterality Date    Colonoscopy with biopsy  02-    Per [facility]      Family History   Problem Relation Age of Onset    Cancer Father         Cancer - unknown    Hypertension Mother     Diabetes Mother       Social History:   Social History     Socioeconomic History    Marital status:    Tobacco Use    Smoking status: Never     Passive exposure: Never    Smokeless tobacco: Never   Vaping Use    Vaping status: Never Used   Substance and Sexual Activity    Alcohol use: No     Alcohol/week: 0.0 standard drinks of alcohol    Drug use: No   Other Topics Concern    Caffeine Concern Yes     Comment: (Coffee, Soda) 1 cup daily        Immunizations:   Immunization History   Administered Date(s) Administered    Covid-19 Vaccine Moderna 100 mcg/0.5 ml 04/06/2021, 05/04/2021    Covid-19 Vaccine Moderna 50 Mcg/0.25 Ml 12/20/2021    Fluvirin, 3 Years & >, Im 11/22/2013       Medications (Active prior to today's visit):  Current Outpatient Medications   Medication Sig Dispense Refill    Blood Pressure Does not apply Kit Use Q day 1 kit 0    amLODIPine 10 MG Oral Tab Take 1  tablet (10 mg total) by mouth once daily. 90 tablet 1    atorvastatin 20 MG Oral Tab Take 1 tablet (20 mg total) by mouth nightly. 90 tablet 3    chlorthalidone 25 MG Oral Tab Take 1 tablet (25 mg total) by mouth daily. 90 tablet 1    cloNIDine 0.2 MG Oral Tab Take 1 tablet (0.2 mg total) by mouth 2 (two) times daily. appointment needed for further refills 60 tablet 1    valsartan 320 MG Oral Tab Take 1 tablet (320 mg total) by mouth daily. appointment needed for further refills 90 tablet 1    Misc Natural Products (GLUCOSAMINE CHONDROITIN VIT D3) Oral Cap Take 2 tab po  capsule 3    Multiple Vitamins-Minerals (MULTIVITAL) Oral Tab Take 1 tablet by mouth daily. 100 tablet 2       Allergies:  Allergies[1]      ROS:   Review of Systems   Constitutional:  Negative for appetite change and fever.   Eyes:  Negative for visual disturbance.   Respiratory:  Negative for shortness of breath.    Cardiovascular:  Negative for chest pain.   Gastrointestinal:  Negative for abdominal pain, nausea and vomiting.   Musculoskeletal:  Negative for back pain.   Skin:  Negative for rash.   Neurological:  Negative for dizziness and headaches.       PHYSICAL EXAM:   VS: /88   Pulse 76   Ht 5' (1.524 m)   Wt 160 lb 12.8 oz (72.9 kg)   BMI 31.40 kg/m²     Physical Exam  Vitals reviewed.   Constitutional:       General: She is not in acute distress.     Appearance: Normal appearance.   HENT:      Head: Normocephalic.   Eyes:      Conjunctiva/sclera: Conjunctivae normal.   Cardiovascular:      Rate and Rhythm: Normal rate and regular rhythm.      Heart sounds: Normal heart sounds.   Pulmonary:      Effort: Pulmonary effort is normal.      Breath sounds: Normal breath sounds.   Musculoskeletal:      Cervical back: Neck supple.   Skin:     Findings: No rash.   Psychiatric:         Mood and Affect: Mood normal.         LABORATORY RESULTS:     EKG / Spirometry : -     Radiology: No results found.     ASSESSMENT/PLAN:   Assessment    Encounter Diagnoses   Name Primary?    Primary hypertension Yes    Encounter for screening mammogram for malignant neoplasm of breast        1. Primary hypertension    MEDICATIONS:   Requested Prescriptions     Signed Prescriptions Disp Refills    Blood Pressure Does not apply Kit 1 kit 0     Sig: Use Q day    amLODIPine 10 MG Oral Tab 90 tablet 1     Sig: Take 1 tablet (10 mg total) by mouth once daily.    atorvastatin 20 MG Oral Tab 90 tablet 3     Sig: Take 1 tablet (20 mg total) by mouth nightly.    chlorthalidone 25 MG Oral Tab 90 tablet 1     Sig: Take 1 tablet (25 mg total) by mouth daily.    cloNIDine 0.2 MG Oral Tab 60 tablet 1     Sig: Take 1 tablet (0.2 mg total) by mouth 2 (two) times daily. appointment needed for further refills    valsartan 320 MG Oral Tab 90 tablet 1     Sig: Take 1 tablet (320 mg total) by mouth daily. appointment needed for further refills      LABORATORY & ORDERS: No orders of the defined types were placed in this encounter.    RECOMMENDATIONS given include: avoid pseudoephedrine or other stimulants/decongestants in common cold remedies, decrease consumption of alcohol, perform routine monitoring of blood pressure with home blood pressure cuff, exercise, reduction of dietary salt intake, take medication as prescribed, try not to miss doses, smoking cessation, weight loss, and stress reduction.    FOLLOW-UP: Schedule a follow-up visit in 1 months.         2. Encounter for screening mammogram for malignant neoplasm of breast    Referral:  [facility]UDY 2D+3D SCREENING BILAT (CPT=77067/26814); Future          Orders This Visit:  No orders of the defined types were placed in this encounter.      Meds This Visit:  Requested Prescriptions     Signed Prescriptions Disp Refills    Blood Pressure Does not apply Kit 1 kit 0     Sig: Use Q day    amLODIPine 10 MG Oral Tab 90 tablet 1     Sig: Take 1 tablet (10 mg total) by mouth once daily.    atorvastatin 20 MG Oral Tab 90 tablet 3     Sig:  Take 1 tablet (20 mg total) by mouth nightly.    chlorthalidone 25 MG Oral Tab 90 tablet 1     Sig: Take 1 tablet (25 mg total) by mouth daily.    cloNIDine 0.2 MG Oral Tab 60 tablet 1     Sig: Take 1 tablet (0.2 mg total) by mouth 2 (two) times daily. appointment needed for further refills    valsartan 320 MG Oral Tab 90 tablet 1     Sig: Take 1 tablet (320 mg total) by mouth daily. appointment needed for further refills       Imaging & Referrals:  [facility]UDY 2D+3D SCREENING BILAT (CPT=77067/76806)         GERALD HAN MD         [1]   Allergies  Allergen Reactions    Lisinopril      Other reaction(s): tongue swelling

## 2025-02-17 ENCOUNTER — OFFICE VISIT (OUTPATIENT)
Dept: FAMILY MEDICINE CLINIC | Facility: CLINIC | Age: 65
End: 2025-02-17

## 2025-02-17 VITALS
HEART RATE: 77 BPM | WEIGHT: 159 LBS | HEIGHT: 60 IN | BODY MASS INDEX: 31.22 KG/M2 | DIASTOLIC BLOOD PRESSURE: 78 MMHG | SYSTOLIC BLOOD PRESSURE: 130 MMHG

## 2025-02-17 DIAGNOSIS — I10 PRIMARY HYPERTENSION: Primary | ICD-10-CM

## 2025-02-17 DIAGNOSIS — M17.12 PRIMARY OSTEOARTHRITIS OF LEFT KNEE: ICD-10-CM

## 2025-02-17 RX ORDER — METHYLPREDNISOLONE ACETATE 80 MG/ML
80 INJECTION, SUSPENSION INTRA-ARTICULAR; INTRALESIONAL; INTRAMUSCULAR; SOFT TISSUE ONCE
Status: COMPLETED | OUTPATIENT
Start: 2025-02-17 | End: 2025-02-17

## 2025-02-17 RX ADMIN — METHYLPREDNISOLONE ACETATE 80 MG: 80 INJECTION, SUSPENSION INTRA-ARTICULAR; INTRALESIONAL; INTRAMUSCULAR; SOFT TISSUE at 18:27:00

## 2025-02-17 NOTE — PROGRESS NOTES
2/17/2025  4:35 PM    Maureen Beltran is a 64 year old female.    Chief complaint(s):   Chief Complaint   Patient presents with    HTN     F/u     Knee Pain     Left knee pain      HPI:     Maureen Beltran primary complaint is regarding HTN.     Patient is a 64 yrs old female, here for follow up regarding her HTN, this was first diagnosed more than  17 years ago.  Current nonpharmacologic treatment includes low sodium diet and exercise.  Her current cardiac medication regimen includes  Clonidine 0.2mg BID, Amlodipine 10 mg, Chlorthalidone 25 mg, Valsartan 320 mg .  Maureen Beltran dose not check her blood pressure other than at his clinic appointments.  Maureen Beltran is tolerating the medication well without side effects.  Compliance with treatment has been fair.      In addition patient has a long history of osteoarthritis of her knees and is complaining of increasing pain to the left knee.  No recent trauma accidents or injuries.  Denies any swelling or significant crepitus and decreased range of motion of the knee.      HISTORY:  Past Medical History:    Essential hypertension    Lipid screening    Per [facility]      Past Surgical History:   Procedure Laterality Date    Colonoscopy with biopsy  02-    Per [facility]      Family History   Problem Relation Age of Onset    Cancer Father         Cancer - unknown    Hypertension Mother     Diabetes Mother       Social History:   Social History     Socioeconomic History    Marital status:    Tobacco Use    Smoking status: Never     Passive exposure: Never    Smokeless tobacco: Never   Vaping Use    Vaping status: Never Used   Substance and Sexual Activity    Alcohol use: No     Alcohol/week: 0.0 standard drinks of alcohol    Drug use: No   Other Topics Concern    Caffeine Concern Yes     Comment: (Coffee, Soda) 1 cup daily        Immunizations:   Immunization History   Administered Date(s) Administered    Covid-19 Vaccine Moderna 100 mcg/0.5 ml 04/06/2021, 05/04/2021     Covid-19 Vaccine Moderna 50 Mcg/0.25 Ml 12/20/2021    Fluvirin, 3 Years & >, Im 11/22/2013       Medications (Active prior to today's visit):  Current Outpatient Medications   Medication Sig Dispense Refill    amLODIPine 10 MG Oral Tab Take 1 tablet (10 mg total) by mouth once daily. 90 tablet 1    atorvastatin 20 MG Oral Tab Take 1 tablet (20 mg total) by mouth nightly. 90 tablet 3    chlorthalidone 25 MG Oral Tab Take 1 tablet (25 mg total) by mouth daily. 90 tablet 1    cloNIDine 0.2 MG Oral Tab Take 1 tablet (0.2 mg total) by mouth 2 (two) times daily. appointment needed for further refills 60 tablet 1    valsartan 320 MG Oral Tab Take 1 tablet (320 mg total) by mouth daily. appointment needed for further refills 90 tablet 1    Misc Natural Products (GLUCOSAMINE CHONDROITIN VIT D3) Oral Cap Take 2 tab po  capsule 3    Multiple Vitamins-Minerals (MULTIVITAL) Oral Tab Take 1 tablet by mouth daily. 100 tablet 2       Allergies:  Allergies[1]      ROS:   Review of Systems   Constitutional:  Negative for appetite change and fever.   Eyes:  Negative for visual disturbance.   Respiratory:  Negative for shortness of breath.    Cardiovascular:  Negative for chest pain.   Gastrointestinal:  Negative for abdominal pain, nausea and vomiting.   Musculoskeletal:  Positive for arthralgias. Negative for back pain.        Left knee pain   Skin:  Negative for rash.   Neurological:  Negative for dizziness and headaches.       PHYSICAL EXAM:   VS: /78   Pulse 77   Ht 5' (1.524 m)   Wt 159 lb (72.1 kg)   BMI 31.05 kg/m²     Physical Exam  Vitals reviewed.   Constitutional:       General: She is not in acute distress.     Appearance: Normal appearance.   HENT:      Head: Normocephalic.   Eyes:      Conjunctiva/sclera: Conjunctivae normal.   Cardiovascular:      Rate and Rhythm: Normal rate.   Pulmonary:      Effort: Pulmonary effort is normal.   Musculoskeletal:      Cervical back: Neck supple.      Comments: Left  knee small effusion, + crepitus, decrease ROM   Skin:     Findings: No rash.   Psychiatric:         Mood and Affect: Mood normal.         Diagnosis/Indication: OA knee .   Procedure Note:  Intra-articular/Arthrocentesis/Injection   Injection involves the left knee.  Written informed consent was obtained.  The site was prepped with alcohol and betadine.  The site was anesthetized with 1 cc of 1% lidocaine.  The joint was injected with (80mg) 1 cc of Methylprednisolone in 1 cc of 1% lidocaine.  The needle was carefully introduced into the joint space. The patient experienced mild discomfort during the procedure.  No complications.  Estimated blood loss: none.      FOLLOW UP; RTC appointment given as needed and/or keep present appointment.      Dillon Peralta MD      LABORATORY RESULTS:     EKG / Spirometry : -     Radiology: No results found.     ASSESSMENT/PLAN:   Assessment   Encounter Diagnoses   Name Primary?    Primary hypertension Yes    Primary osteoarthritis of left knee        1. Primary hypertension    MEDICATIONS: CPM    RECOMMENDATIONS given include: avoid pseudoephedrine or other stimulants/decongestants in common cold remedies, decrease consumption of alcohol, perform routine monitoring of blood pressure with home blood pressure cuff, exercise, reduction of dietary salt intake, take medication as prescribed, try not to miss doses, smoking cessation, weight loss, and stress reduction.    FOLLOW-UP: Schedule a follow-up visit in 6 months.         2. Primary osteoarthritis of left knee    MEDICATIONS:  CPM  S/p intra-articular injection given today   RECOMMENDATIONS given include: Patient was reassured of  her medical condition and all questions and concerns were answered. Patient was informed to please, call our office with any new or further questions or concerns that may come up in the near future. Notify Dr Peralta or the [facility] if there is a deterioration or worsening of the medical  condition. Also, inform the doctor with any new symptoms or medications' side effects.      FOLLOW-UP: Schedule a follow-up visit in  [place].             Orders This Visit:  No orders of the defined types were placed in this encounter.      Meds This Visit:  Requested Prescriptions      No prescriptions requested or ordered in this encounter       Imaging & Referrals:  None         GERALD HAN MD         [1]   Allergies  Allergen Reactions    Lisinopril      Other reaction(s): tongue swelling

## 2025-03-08 ENCOUNTER — HOSPITAL ENCOUNTER (OUTPATIENT)
Dept: MAMMOGRAPHY | Age: 65
Discharge: HOME | End: 2025-03-08
Attending: FAMILY MEDICINE
Payer: COMMERCIAL

## 2025-03-08 DIAGNOSIS — Z12.31 ENCOUNTER FOR SCREENING MAMMOGRAM FOR MALIGNANT NEOPLASM OF BREAST: ICD-10-CM

## 2025-03-08 PROCEDURE — 77063 BREAST TOMOSYNTHESIS BI: CPT | Performed by: FAMILY MEDICINE

## 2025-03-08 PROCEDURE — 77067 SCR MAMMO BI INCL CAD: CPT | Performed by: FAMILY MEDICINE

## 2025-03-10 ENCOUNTER — OFFICE VISIT (OUTPATIENT)
Dept: FAMILY MEDICINE CLINIC | Facility: CLINIC | Age: 65
End: 2025-03-10

## 2025-03-10 VITALS
BODY MASS INDEX: 31.22 KG/M2 | HEIGHT: 60 IN | SYSTOLIC BLOOD PRESSURE: 128 MMHG | TEMPERATURE: 97 F | HEART RATE: 75 BPM | WEIGHT: 159 LBS | DIASTOLIC BLOOD PRESSURE: 73 MMHG

## 2025-03-10 DIAGNOSIS — M17.11 PRIMARY OSTEOARTHRITIS OF RIGHT KNEE: Primary | ICD-10-CM

## 2025-03-10 RX ORDER — METHYLPREDNISOLONE ACETATE 80 MG/ML
80 INJECTION, SUSPENSION INTRA-ARTICULAR; INTRALESIONAL; INTRAMUSCULAR; SOFT TISSUE ONCE
Status: COMPLETED | OUTPATIENT
Start: 2025-03-10 | End: 2025-03-10

## 2025-03-10 RX ADMIN — METHYLPREDNISOLONE ACETATE 80 MG: 80 INJECTION, SUSPENSION INTRA-ARTICULAR; INTRALESIONAL; INTRAMUSCULAR; SOFT TISSUE at 14:48:00

## 2025-03-10 NOTE — PROGRESS NOTES
3/10/2025  2:38 PM    Maureen Beltran is a 64 year old female.    Chief complaint(s):   Chief Complaint   Patient presents with    Follow - Up     Patient is coming in for a 3 week follow up on her knee      HPI:     Maureen Beltran primary complaint is regarding right knee pain.     Patient is a 64-year-old female with long history of osteoarthritis who presents complaining right knee pain for the past few months.  About 3 weeks ago patient had an intra-articular injection given to the left knee that is now much better.  Consent obtained verbally for intra-articular injection with Depo-Medrol to the right knee today.    HISTORY:  Past Medical History:    Essential hypertension    Lipid screening    Per [facility]      Past Surgical History:   Procedure Laterality Date    Colonoscopy with biopsy  02-    Per [facility]      Family History   Problem Relation Age of Onset    Cancer Father         Cancer - unknown    Hypertension Mother     Diabetes Mother       Social History:   Social History     Socioeconomic History    Marital status:    Tobacco Use    Smoking status: Never     Passive exposure: Never    Smokeless tobacco: Never   Vaping Use    Vaping status: Never Used   Substance and Sexual Activity    Alcohol use: No     Alcohol/week: 0.0 standard drinks of alcohol    Drug use: No   Other Topics Concern    Caffeine Concern Yes     Comment: (Coffee, Soda) 1 cup daily        Immunizations:   Immunization History   Administered Date(s) Administered    Covid-19 Vaccine Moderna 100 mcg/0.5 ml 04/06/2021, 05/04/2021    Covid-19 Vaccine Moderna 50 Mcg/0.25 Ml 12/20/2021    Fluvirin, 3 Years & >, Im 11/22/2013       Medications (Active prior to today's visit):  Current Outpatient Medications   Medication Sig Dispense Refill    amLODIPine 10 MG Oral Tab Take 1 tablet (10 mg total) by mouth once daily. 90 tablet 1    atorvastatin 20 MG Oral Tab Take 1 tablet (20 mg total) by mouth nightly. 90 tablet 3    chlorthalidone  25 MG Oral Tab Take 1 tablet (25 mg total) by mouth daily. 90 tablet 1    cloNIDine 0.2 MG Oral Tab Take 1 tablet (0.2 mg total) by mouth 2 (two) times daily. appointment needed for further refills 60 tablet 1    valsartan 320 MG Oral Tab Take 1 tablet (320 mg total) by mouth daily. appointment needed for further refills 90 tablet 1    Misc Natural Products (GLUCOSAMINE CHONDROITIN VIT D3) Oral Cap Take 2 tab po  capsule 3    Multiple Vitamins-Minerals (MULTIVITAL) Oral Tab Take 1 tablet by mouth daily. 100 tablet 2       Allergies:  Allergies[1]      ROS:   Review of Systems   Constitutional:  Negative for appetite change and fever.   Eyes:  Negative for visual disturbance.   Respiratory:  Negative for shortness of breath.    Cardiovascular:  Negative for chest pain.   Gastrointestinal:  Negative for abdominal pain, nausea and vomiting.   Musculoskeletal:  Positive for arthralgias (right knee pain). Negative for back pain.   Skin:  Negative for rash.   Neurological:  Negative for dizziness and headaches.       PHYSICAL EXAM:   VS: /73 (BP Location: Right arm, Patient Position: Sitting, Cuff Size: adult)   Pulse 75   Temp 97 °F (36.1 °C)   Ht 5' (1.524 m)   Wt 159 lb (72.1 kg)   BMI 31.05 kg/m²     Physical Exam  Vitals reviewed.   Constitutional:       General: She is not in acute distress.     Appearance: Normal appearance.   HENT:      Head: Normocephalic.   Eyes:      Conjunctiva/sclera: Conjunctivae normal.   Cardiovascular:      Rate and Rhythm: Normal rate.   Pulmonary:      Effort: Pulmonary effort is normal.   Musculoskeletal:      Cervical back: Neck supple.      Comments: Right knee decrease ROM, ++ crepitous    Skin:     Findings: No rash.   Psychiatric:         Mood and Affect: Mood normal.       3/10/2025, 5:44 PM    Maureen Kelleyos,  1960.    Procedure:  /73 (BP Location: Right arm, Patient Position: Sitting, Cuff Size: adult)   Pulse 75   Temp 97 °F (36.1 °C)   Ht 5'  (1.524 m)   Wt 159 lb (72.1 kg)   BMI 31.05 kg/m²     Diagnosis/Indication: OA  .   Procedure Note:  Intra-articular injection   Injection involves the right knee.  Written informed consent was obtained.  The site was prepped with alcohol and betadine.  The site was anesthetized with 1 cc of 1% lidocaine.  The joint was injected with (80mg) 1 cc of Methylprednisolone in 1 cc of 1% lidocaine.  The needle was carefully introduced into the joint space. The patient experienced mild discomfort during the procedure.  No complications.  Estimated blood loss: none.      FOLLOW UP; RTC appointment given as needed and/or keep present appointment.      Gerald Han MD    LABORATORY RESULTS:     EKG / Spirometry : -     Radiology: No results found.     ASSESSMENT/PLAN:   Assessment   Encounter Diagnosis   Name Primary?    Primary osteoarthritis of right knee Yes       MEDICATIONS:   CPM       RECOMMENDATIONS given include: Patient was reassured of  her medical condition and all questions and concerns were answered. Patient was informed to please, call our office with any new or further questions or concerns that may come up in the near future. Notify Dr Han or the [facility] if there is a deterioration or worsening of the medical condition. Also, inform the doctor with any new symptoms or medications' side effects.      FOLLOW-UP: Schedule a follow-up visit in  [facility].            Orders This Visit:  No orders of the defined types were placed in this encounter.      Meds This Visit:  Requested Prescriptions      No prescriptions requested or ordered in this encounter       Imaging & Referrals:  None         GERALD HAN MD         [1]   Allergies  Allergen Reactions    Lisinopril      Other reaction(s): tongue swelling

## 2025-08-18 ENCOUNTER — OFFICE VISIT (OUTPATIENT)
Dept: FAMILY MEDICINE CLINIC | Facility: CLINIC | Age: 65
End: 2025-08-18

## 2025-08-18 VITALS
BODY MASS INDEX: 31 KG/M2 | DIASTOLIC BLOOD PRESSURE: 74 MMHG | HEART RATE: 72 BPM | WEIGHT: 159.81 LBS | SYSTOLIC BLOOD PRESSURE: 121 MMHG

## 2025-08-18 DIAGNOSIS — I10 PRIMARY HYPERTENSION: Primary | ICD-10-CM

## 2025-08-18 PROCEDURE — 3074F SYST BP LT 130 MM HG: CPT | Performed by: FAMILY MEDICINE

## 2025-08-18 PROCEDURE — 3078F DIAST BP <80 MM HG: CPT | Performed by: FAMILY MEDICINE

## 2025-08-18 PROCEDURE — 99213 OFFICE O/P EST LOW 20 MIN: CPT | Performed by: FAMILY MEDICINE

## 2025-08-18 RX ORDER — AMLODIPINE BESYLATE 10 MG/1
10 TABLET ORAL
Qty: 90 TABLET | Refills: 3 | Status: SHIPPED | OUTPATIENT
Start: 2025-08-18

## 2025-08-18 RX ORDER — CLONIDINE HYDROCHLORIDE 0.2 MG/1
0.2 TABLET ORAL 2 TIMES DAILY
Qty: 180 TABLET | Refills: 3 | Status: SHIPPED | OUTPATIENT
Start: 2025-08-18

## 2025-08-18 RX ORDER — VALSARTAN 320 MG/1
320 TABLET ORAL DAILY
Qty: 90 TABLET | Refills: 3 | Status: SHIPPED | OUTPATIENT
Start: 2025-08-18

## 2025-08-18 RX ORDER — CHLORTHALIDONE 25 MG/1
25 TABLET ORAL DAILY
Qty: 90 TABLET | Refills: 3 | Status: SHIPPED | OUTPATIENT
Start: 2025-08-18

## (undated) NOTE — MR AVS SNAPSHOT
[address], [address]  [address] [facility]  [phone]               Thank you for choosing us for your health care visit with Kina Delarosa MD.  We are glad to serve you and happy to provide you with this summ - AmLODIPine Besylate 5 MG Tabs  - CloNIDine HCl 0.1 MG Tabs  - valsartan 320 MG Tabs            MyChart                  Visit [facility] online at  [email or web address]

## (undated) NOTE — LETTER
3/10/2025              Maureen Beltran        [address] [facility] [address]         To Whom it may concern:    This is to certify that Maureen Beltran had an appointment on 3/10/2025 at 2:55 PM with GERALD HAN MD.        Sincerely,       GERALD HAN MD  [address]  [facility] [address]  [phone]        Document electronically generated by:  GERALD HAN MD

## (undated) NOTE — LETTER
Date & Time: 8/12/2019, 6:33 PM  Patient: Quinton Gould  Encounter Provider(s):    Sejal Nur MD       To Whom It May Concern:    Quinton Gould was seen and treated in our department on 8/12/2019. She should not return to work until 8/15/19.     If

## (undated) NOTE — MR AVS SNAPSHOT
[address], [address]  [address] [facility]  [phone]               Thank you for choosing us for your health care visit with Crissy Hadley MD.  We are glad to serve you and happy to provide you with this summ You can get these medications from any pharmacy     Bring a paper prescription for each of these medications    - Blood Pressure Kit            MyChart               Educational Information     Healthy Diet and Regular Exercise  [employer]